# Patient Record
Sex: FEMALE | Race: OTHER | NOT HISPANIC OR LATINO | URBAN - METROPOLITAN AREA
[De-identification: names, ages, dates, MRNs, and addresses within clinical notes are randomized per-mention and may not be internally consistent; named-entity substitution may affect disease eponyms.]

---

## 2018-03-29 PROBLEM — Z00.00 ENCOUNTER FOR PREVENTIVE HEALTH EXAMINATION: Status: ACTIVE | Noted: 2018-03-29

## 2018-03-30 ENCOUNTER — OUTPATIENT (OUTPATIENT)
Dept: OUTPATIENT SERVICES | Facility: HOSPITAL | Age: 68
LOS: 1 days | End: 2018-03-30

## 2018-03-30 ENCOUNTER — APPOINTMENT (OUTPATIENT)
Dept: OPHTHALMOLOGY | Facility: CLINIC | Age: 68
End: 2018-03-30

## 2018-04-02 DIAGNOSIS — H40.003 PREGLAUCOMA, UNSPECIFIED, BILATERAL: ICD-10-CM

## 2019-03-20 ENCOUNTER — APPOINTMENT (OUTPATIENT)
Dept: OPHTHALMOLOGY | Facility: CLINIC | Age: 69
End: 2019-03-20

## 2019-03-27 ENCOUNTER — OUTPATIENT (OUTPATIENT)
Dept: OUTPATIENT SERVICES | Facility: HOSPITAL | Age: 69
LOS: 1 days | End: 2019-03-27

## 2019-03-27 ENCOUNTER — APPOINTMENT (OUTPATIENT)
Dept: OPHTHALMOLOGY | Facility: CLINIC | Age: 69
End: 2019-03-27

## 2019-03-27 DIAGNOSIS — H40.003 PREGLAUCOMA, UNSPECIFIED, BILATERAL: ICD-10-CM

## 2019-04-02 ENCOUNTER — APPOINTMENT (OUTPATIENT)
Dept: OPHTHALMOLOGY | Facility: CLINIC | Age: 69
End: 2019-04-02

## 2019-05-28 ENCOUNTER — APPOINTMENT (OUTPATIENT)
Dept: PHYSICAL MEDICINE AND REHAB | Facility: CLINIC | Age: 69
End: 2019-05-28
Payer: MEDICARE

## 2019-05-28 VITALS — WEIGHT: 222 LBS | HEIGHT: 62 IN | BODY MASS INDEX: 40.85 KG/M2

## 2019-05-28 DIAGNOSIS — Z87.39 PERSONAL HISTORY OF OTHER DISEASES OF THE MUSCULOSKELETAL SYSTEM AND CONNECTIVE TISSUE: ICD-10-CM

## 2019-05-28 DIAGNOSIS — Z86.39 PERSONAL HISTORY OF OTHER ENDOCRINE, NUTRITIONAL AND METABOLIC DISEASE: ICD-10-CM

## 2019-05-28 DIAGNOSIS — Z86.69 PERSONAL HISTORY OF OTHER DISEASES OF THE NERVOUS SYSTEM AND SENSE ORGANS: ICD-10-CM

## 2019-05-28 DIAGNOSIS — Z86.79 PERSONAL HISTORY OF OTHER DISEASES OF THE CIRCULATORY SYSTEM: ICD-10-CM

## 2019-05-28 DIAGNOSIS — Z78.9 OTHER SPECIFIED HEALTH STATUS: ICD-10-CM

## 2019-05-28 PROCEDURE — 99213 OFFICE O/P EST LOW 20 MIN: CPT | Mod: 25

## 2019-05-28 PROCEDURE — 20552 NJX 1/MLT TRIGGER POINT 1/2: CPT

## 2019-05-28 RX ORDER — METFORMIN HYDROCHLORIDE 1000 MG/1
1000 TABLET, COATED ORAL
Refills: 0 | Status: ACTIVE | COMMUNITY

## 2019-05-28 RX ORDER — LIRAGLUTIDE 6 MG/ML
18 INJECTION SUBCUTANEOUS
Refills: 0 | Status: ACTIVE | COMMUNITY

## 2019-05-28 RX ORDER — ROSUVASTATIN CALCIUM 10 MG/1
10 TABLET, FILM COATED ORAL
Refills: 0 | Status: ACTIVE | COMMUNITY

## 2019-05-28 RX ORDER — LAMOTRIGINE 25 MG/1
25 TABLET ORAL
Refills: 0 | Status: ACTIVE | COMMUNITY

## 2019-05-28 RX ORDER — LOSARTAN POTASSIUM 100 MG/1
100 TABLET, FILM COATED ORAL
Refills: 0 | Status: ACTIVE | COMMUNITY

## 2019-05-28 RX ORDER — ALENDRONATE SODIUM 70 MG/1
70 TABLET ORAL
Refills: 0 | Status: ACTIVE | COMMUNITY

## 2019-05-28 RX ORDER — METOCLOPRAMIDE 5 MG/1
TABLET ORAL
Refills: 0 | Status: ACTIVE | COMMUNITY

## 2019-05-28 RX ORDER — ACETAMINOPHEN 325 MG
TABLET ORAL
Refills: 0 | Status: ACTIVE | COMMUNITY

## 2019-05-28 NOTE — HISTORY OF PRESENT ILLNESS
[FreeTextEntry1] : Location: back\par Quality: sharp \par Severity: moderate \par Duration: few years \par Timing: recurrent \par Context: atraumatic \par Aggravating Factors: walking \par Alleviating Factors: KELLE; RF \par Associated Symptoms: no weakness; no weight loss; no fever; no chills; no change in bowel/bladder habits; numbness/tingling (tingling in legs); no radiation down leg\par Prior Studies: MRI\par \par 12/2013 right MBB with Dr. Laurent\par 4/2014 right RFA with Dr. Laurent - significant relief\par 1/2015 left L3, L4, L5 MBB \par 2/2015 left RFA - significant relief\par 5/2016 right RFA - significant relief\par 12/2016 right L5 and S1 KELLE - moderate relief\par 11/2018 bilateral S1 KELLE - significant relief in legs and right back\par 12/2018 left L4/5 and L5/S1 facet injection\par 12/2018 left L3, L4, L5 RFA - no more pain with oblique ext\par 1/2019 left SI joint injection

## 2019-05-28 NOTE — PROCEDURE
[de-identified] : \par \par 0.25 x 40 mm sterile solid steel needles were inserted into right Ub 25 to 26, and glut bianca and manipulated intermittently over the following 15 minutes. The needles were then removed. Hemostasis was achieved immediately. She  tolerated the procedure well and was given discharge instructions and then discharged to home without any complaints or complications.\par \par

## 2019-05-28 NOTE — PHYSICAL EXAM
[FreeTextEntry1] : BEN is a 69 year female \par Constitutional: healthy appearing, NAD, and obese\par \par LUMBAR\par ROM: flexion to 20 deg with pain, no pain with ext\par \par Gait: normal\par \par Inspection: no erythema, warmth\par Spine: no TTP in spinous process, SI joint, sacrum\par Bony palpation: no TTP in GT\par \par Soft tissue palpation hip: TTP in right gluteus bianca, no TTP in glut medius\par Soft tissue palpation of spine: no TTP in lumbar paraspinals\par \par 5/5 bilateral HF, KE, DF, PF \par sensation intact in bilat LE\par reflexes: knee and ankle 0 bilat\par \par Special tests: neg seated SLR\par \par

## 2019-07-18 ENCOUNTER — APPOINTMENT (OUTPATIENT)
Dept: PHYSICAL MEDICINE AND REHAB | Facility: CLINIC | Age: 69
End: 2019-07-18
Payer: MEDICARE

## 2019-07-18 VITALS — HEIGHT: 62 IN | BODY MASS INDEX: 40.85 KG/M2 | WEIGHT: 222 LBS

## 2019-07-18 PROCEDURE — 20552 NJX 1/MLT TRIGGER POINT 1/2: CPT

## 2019-07-18 PROCEDURE — 99213 OFFICE O/P EST LOW 20 MIN: CPT | Mod: 25

## 2019-07-18 NOTE — PHYSICAL EXAM
[FreeTextEntry1] : BEN is a 69 year female \par Constitutional: healthy appearing, NAD, and obese\par \par LUMBAR\par ROM: flexion to 20 deg with pain, no pain with ext\par \par Gait: antalgic with cane\par \par Inspection: no erythema, warmth\par Spine: no TTP in spinous process, SI joint, sacrum\par Bony palpation: no TTP in GT\par \par Soft tissue palpation hip: TTP in right gluteus bianca, no TTP in glut medius\par Soft tissue palpation of spine: no TTP in lumbar paraspinals\par \par 5/5 bilateral HF, KE, DF, PF \par sensation intact in bilat LE\par reflexes: knee and ankle 0 bilat\par \par Special tests: neg seated SLR\par

## 2019-07-18 NOTE — HISTORY OF PRESENT ILLNESS
[FreeTextEntry1] : Location: back\par Quality: sharp \par Severity: 7/10, worse lately\par Duration: few years \par Timing: recurrent \par Context: atraumatic \par Aggravating Factors: walking \par Alleviating Factors: KELLE; RF \par Associated Symptoms: no weakness; no weight loss; no fever; no chills; no change in bowel/bladder habits; numbness/tingling (tingling in legs); no radiation down leg\par Prior Studies: MRI\par \par 12/2013 right MBB with Dr. Laurnet\par 4/2014 right RFA with Dr. Laurent - significant relief\par 1/2015 left L3, L4, L5 MBB \par 2/2015 left RFA - significant relief\par 5/2016 right RFA - significant relief\par 12/2016 right L5 and S1 KELLE - moderate relief\par 11/2018 bilateral S1 KELLE - significant relief in legs and right back\par 12/2018 left L4/5 and L5/S1 facet injection\par 12/2018 left L3, L4, L5 RFA - no more pain with oblique ext\par 1/2019 left SI joint injection

## 2019-07-18 NOTE — PROCEDURE
[de-identified] : After informed consent,she elected to proceed with a trigger point injection into the bilateral gluteus bianca. I confirmed no prior adverse reactions, no active infections, and no relevant allergies. \par  \par The skin was prepped in the usual sterile manner.  The sites were injected with local anesthetic followed by local needling. The injection was completed without complication and a bandage was applied. She tolerated the procedure well and was given post-injection instructions. \par  \par Cold Tx x 48 hours, analgesics prn. Medications: 0.5 ml of 1% Lidocaine per site\par

## 2019-07-19 ENCOUNTER — APPOINTMENT (OUTPATIENT)
Dept: PHYSICAL MEDICINE AND REHAB | Facility: CLINIC | Age: 69
End: 2019-07-19
Payer: MEDICARE

## 2019-07-19 PROCEDURE — 27096 INJECT SACROILIAC JOINT: CPT | Mod: LT

## 2019-09-03 ENCOUNTER — APPOINTMENT (OUTPATIENT)
Dept: PHYSICAL MEDICINE AND REHAB | Facility: CLINIC | Age: 69
End: 2019-09-03
Payer: MEDICARE

## 2019-09-03 PROCEDURE — 27096 INJECT SACROILIAC JOINT: CPT | Mod: RT

## 2019-09-24 ENCOUNTER — APPOINTMENT (OUTPATIENT)
Dept: PHYSICAL MEDICINE AND REHAB | Facility: CLINIC | Age: 69
End: 2019-09-24
Payer: MEDICARE

## 2019-09-24 PROCEDURE — 99214 OFFICE O/P EST MOD 30 MIN: CPT

## 2019-09-24 NOTE — ASSESSMENT
[FreeTextEntry1] : Will do right MBB. Do partial bridges.  Taught wall squat.  Educated to lose weight.

## 2019-09-24 NOTE — HISTORY OF PRESENT ILLNESS
[FreeTextEntry1] : Location: back\par Quality: sharp \par Severity: 8/10, worse lately\par Duration: few years \par Timing: recurrent \par Context: atraumatic \par Aggravating Factors: walking \par Alleviating Factors: KELLE; RF \par Associated Symptoms: no weakness; no weight loss; no fever; no chills; no change in bowel/bladder habits; numbness/tingling (tingling in legs); no radiation down leg\par Prior Studies: MRI\par \par 12/2013 right MBB with Dr. Laurent\par 4/2014 right RFA with Dr. Laurent - significant relief\par 1/2015 left L3, L4, L5 MBB \par 2/2015 left RFA - significant relief\par 5/2016 right RFA - significant relief\par 12/2016 right L5 and S1 KELLE - moderate relief\par 11/2018 bilateral S1 KELLE - significant relief in legs and right back\par 12/2018 left L4/5 and L5/S1 facet injection\par 12/2018 left L3, L4, L5 RFA - no more pain with oblique ext\par 1/2019 left SI joint injection\par 7/2019 left SI joint injection\par 9/2019 right SI joint injection

## 2019-09-24 NOTE — PHYSICAL EXAM
[FreeTextEntry1] : BEN is a 69 year female \par Constitutional: healthy appearing, NAD, and obese\par \par LUMBAR\par ROM: flexion to 20 deg with pain, ext 5 deg\par \par Gait: antalgic with cane\par \par Inspection: no erythema, warmth\par Spine: no TTP in spinous process, TTP in right SI joint, no TTP in sacrum\par Bony palpation: no TTP in GT\par \par Soft tissue palpation hip: TTP in right gluteus bianca, no TTP in glut medius\par Soft tissue palpation of spine: no TTP in lumbar paraspinals\par \par 5/5 bilateral HF, KE, DF, PF \par sensation intact in bilat LE\par reflexes: knee and ankle 0 bilat\par \par Special tests: neg seated SLR\par

## 2019-09-26 ENCOUNTER — APPOINTMENT (OUTPATIENT)
Dept: PHYSICAL MEDICINE AND REHAB | Facility: CLINIC | Age: 69
End: 2019-09-26
Payer: MEDICARE

## 2019-09-26 VITALS — HEIGHT: 62 IN | BODY MASS INDEX: 40.85 KG/M2 | WEIGHT: 222 LBS

## 2019-09-26 PROCEDURE — 99213 OFFICE O/P EST LOW 20 MIN: CPT

## 2019-09-26 NOTE — HISTORY OF PRESENT ILLNESS
[FreeTextEntry1] : Location: neck \par Quality: dull ache, sharp, shooting, achy \par current pain level: 8/10\par Duration: 2 weeks \par Frequency: constant pain \par Alleviating Factors:moltrin when it gets bad at night \par Aggravating Factors: lying down, sitting up, standing, walking, turning head \par Conservative treatment tried: chiropractor but did not help, heat, ice \par Associated Symptoms: headaches, has neck arthritis \par Prior Studies: mri in 2015 \par

## 2019-09-26 NOTE — ASSESSMENT
[FreeTextEntry1] : Had left C3/4/5 MBB previously, patient reported greater than 80% improvement in symptoms transiently after medial branch blocks as reported by pain diary. Pain then returned to pre injection level. Will perform left sided MBB again, she may be candidate for RFA after. May need to repeat on right side after left side is done. \par

## 2019-09-26 NOTE — PHYSICAL EXAM
[Normal] : Oriented to person, place, and time, insight and judgement were intact and the affect was normal [de-identified] : no gross deformity, TTP over the bilateral cervical paraspinals, ROM limited in all planes, negative Spurling's, negative Gaming's\par

## 2019-09-27 ENCOUNTER — APPOINTMENT (OUTPATIENT)
Dept: PHYSICAL MEDICINE AND REHAB | Facility: CLINIC | Age: 69
End: 2019-09-27

## 2019-10-01 ENCOUNTER — APPOINTMENT (OUTPATIENT)
Dept: PHYSICAL MEDICINE AND REHAB | Facility: CLINIC | Age: 69
End: 2019-10-01
Payer: MEDICARE

## 2019-10-01 PROCEDURE — 64491 INJ PARAVERT F JNT C/T 2 LEV: CPT | Mod: LT

## 2019-10-01 PROCEDURE — 64490 INJ PARAVERT F JNT C/T 1 LEV: CPT | Mod: LT

## 2019-10-03 ENCOUNTER — APPOINTMENT (OUTPATIENT)
Dept: PHYSICAL MEDICINE AND REHAB | Facility: CLINIC | Age: 69
End: 2019-10-03

## 2019-10-10 ENCOUNTER — APPOINTMENT (OUTPATIENT)
Dept: PHYSICAL MEDICINE AND REHAB | Facility: CLINIC | Age: 69
End: 2019-10-10
Payer: MEDICARE

## 2019-10-10 PROCEDURE — 64633 DESTROY CERV/THOR FACET JNT: CPT | Mod: LT

## 2019-10-10 PROCEDURE — 64634 DESTROY C/TH FACET JNT ADDL: CPT | Mod: LT

## 2019-10-23 ENCOUNTER — APPOINTMENT (OUTPATIENT)
Dept: PHYSICAL MEDICINE AND REHAB | Facility: CLINIC | Age: 69
End: 2019-10-23
Payer: MEDICARE

## 2019-10-23 VITALS — BODY MASS INDEX: 40.85 KG/M2 | HEIGHT: 62 IN | WEIGHT: 222 LBS

## 2019-10-23 DIAGNOSIS — M54.81 OCCIPITAL NEURALGIA: ICD-10-CM

## 2019-10-23 PROCEDURE — 99214 OFFICE O/P EST MOD 30 MIN: CPT

## 2019-10-24 ENCOUNTER — APPOINTMENT (OUTPATIENT)
Dept: PHYSICAL MEDICINE AND REHAB | Facility: CLINIC | Age: 69
End: 2019-10-24
Payer: MEDICARE

## 2019-10-24 PROCEDURE — 20552 NJX 1/MLT TRIGGER POINT 1/2: CPT

## 2019-10-24 PROCEDURE — 99213 OFFICE O/P EST LOW 20 MIN: CPT | Mod: 25

## 2019-10-24 NOTE — HISTORY OF PRESENT ILLNESS
[FreeTextEntry1] : Location: neck \par Quality: dull ache, sharp, shooting, achy \par current pain level: 8/10\par Duration: few weeks \par Frequency: constant pain \par Alleviating Factors:motrin when it gets bad at night \par Aggravating Factors: lying down, sitting up, standing, walking, turning head \par Conservative treatment tried: chiropractor but did not help, heat, ice \par Associated Symptoms: headaches, no numbness, radiation down arm\par Prior Studies: MRI\par \par cervical RFA with Dr Murphy\par \par Back is not too bad lately.

## 2019-10-24 NOTE — PROCEDURE
[de-identified] : \par Trigger Point Tx\par After cleaning with alcohol, sterile needles were inserted into Du 14 and left trapezius in multiple areas and manipulated intermittently followed by injection of Lidocaine. The needles were then removed. Hemostasis was achieved immediately. She  tolerated the procedure well and was given discharge instructions and then discharged to home without any complaints or complications.\par \par

## 2019-10-24 NOTE — PHYSICAL EXAM
[FreeTextEntry1] : BEN is a 69 year  yr old female \par \par Constitutional: healthy appearing, NAD, and obese\par \par NECK\par ROM: flexion to 30, ext to 30, rotation to 45 deg bilat\par \par Inspection: no erythema, warmth\par Spine: no TTP in spinous process\par Soft tissue palpation: no TTP in cervical paraspinals, rhomboids, TTP in left trapezius\par \par 5/5 bilateral elb flex/ext, WE, finger abd/flex\par sensation intact in bilat UE\par reflexes:  biceps and triceps 0 bilat\par \par \par \par

## 2019-10-24 NOTE — ASSESSMENT
[FreeTextEntry1] : She canceled MBB because had rash which turned out to be dermatitis.  F/u with Derm.  Pain is not bad so hold off on MBB. \par \par Consider repeat MRI cervical if not better in a couple wk.

## 2019-10-28 NOTE — HISTORY OF PRESENT ILLNESS
[FreeTextEntry1] : Patient presents for follow up, reports improvement in left sided neck pain but now is having stiffness in the left scapular muscles and left occiput. Pain is severe. MRI reviewed again and there is left sided foraminal narrowing in multiple levels of the C spine.

## 2019-10-28 NOTE — PHYSICAL EXAM
[Normal] : Deep tendon reflexes were 2+ and symmetric, the sensory exam was normal to light touch and pinprick and no focal deficits [de-identified] : no gross deformity, TTP over the bilateral cervical paraspinals, ROM limited in all planes, negative Spurling's, negative Gaming's\par

## 2019-10-28 NOTE — ASSESSMENT
[FreeTextEntry1] : Left sided RFA done less than 2 weeks ago, may still be too early to determine improvement. Symptoms are also different than before. Her previous pain is much better. Current symptoms could be a combination of occipital neuralgia and/or cervical radiculitis. Recommend chiro, PT, acupuncture and follow up after Nov 7. Consider occipital nerve block if no improvement. May also need left C7/T1 ILESI.

## 2019-10-31 ENCOUNTER — APPOINTMENT (OUTPATIENT)
Dept: PHYSICAL MEDICINE AND REHAB | Facility: CLINIC | Age: 69
End: 2019-10-31
Payer: MEDICARE

## 2019-10-31 PROCEDURE — 20552 NJX 1/MLT TRIGGER POINT 1/2: CPT

## 2019-10-31 PROCEDURE — 99213 OFFICE O/P EST LOW 20 MIN: CPT | Mod: 25

## 2019-10-31 NOTE — PROCEDURE
[de-identified] : \par Trigger Point Tx\par After cleaning with alcohol, sterile needles were inserted into  Du 14, and left trapezius in multiple spots and manipulated intermittently followed by injection of Lidocaine. The needles were then removed. Hemostasis was achieved immediately. She  tolerated the procedure well and was given discharge instructions and then discharged to home without any complaints or complications.\par \par

## 2019-10-31 NOTE — ASSESSMENT
[FreeTextEntry1] : Her dermatologist passed away so find another one.  \par \par Do not lean on ice pack.  Gently apply it to back of neck.

## 2019-10-31 NOTE — PHYSICAL EXAM
[FreeTextEntry1] : BEN is a 69 year yr old female \par \par Constitutional: healthy appearing, NAD, and obese\par \par NECK\par ROM: flexion to 30, ext to 30, rotation to 45 deg bilat\par \par Inspection: no erythema, warmth\par Spine: no TTP in spinous process\par Soft tissue palpation: no TTP in cervical paraspinals, rhomboids, TTP in left trapezius\par \par 5/5 bilateral elb flex/ext, WE, finger abd/flex\par sensation intact in bilat UE\par reflexes: biceps and triceps 0 bilat\par

## 2019-10-31 NOTE — HISTORY OF PRESENT ILLNESS
[FreeTextEntry1] : Location: neck \par Quality: dull ache, sharp, shooting\par current pain level moderate\par Duration: few weeks \par Frequency: constant pain \par Alleviating Factors:motrin when it gets bad at night \par Aggravating Factors: lying down, sitting up, standing, walking, turning head \par Conservative treatment tried: chiropractor but did not help, heat, ice \par Associated Symptoms: +headaches, no numbness, no radiation down arm\par Prior Studies: MRI\par \par cervical RFA with Dr Murphy\par \par

## 2019-11-07 ENCOUNTER — APPOINTMENT (OUTPATIENT)
Dept: PHYSICAL MEDICINE AND REHAB | Facility: CLINIC | Age: 69
End: 2019-11-07
Payer: MEDICARE

## 2019-11-07 PROCEDURE — 99213 OFFICE O/P EST LOW 20 MIN: CPT | Mod: 25

## 2019-11-07 PROCEDURE — 20552 NJX 1/MLT TRIGGER POINT 1/2: CPT

## 2019-11-07 NOTE — PROCEDURE
[de-identified] : \par Trigger Point Tx\par After cleaning with alcohol, sterile needles were inserted into  Du 14, and left trapezius in multiple spots and manipulated intermittently followed by injection of Lidocaine. The needles were then removed. Hemostasis was achieved immediately. She  tolerated the procedure well and was given discharge instructions and then discharged to home without any complaints or complications.\par \par

## 2019-11-18 ENCOUNTER — APPOINTMENT (OUTPATIENT)
Dept: PHYSICAL MEDICINE AND REHAB | Facility: CLINIC | Age: 69
End: 2019-11-18
Payer: MEDICARE

## 2019-11-18 PROCEDURE — 99213 OFFICE O/P EST LOW 20 MIN: CPT | Mod: 25

## 2019-11-18 PROCEDURE — 20552 NJX 1/MLT TRIGGER POINT 1/2: CPT

## 2019-11-18 NOTE — PROCEDURE
[de-identified] : \par Trigger Point Tx\par After cleaning with alcohol, sterile needles were inserted into Du 14, and trapezius bilaterally and manipulated intermittently followed by injection of Lidocaine. The needles were then removed. Hemostasis was achieved immediately. She  tolerated the procedure well and was given discharge instructions and then discharged to home without any complaints or complications.\par \par

## 2019-11-19 ENCOUNTER — APPOINTMENT (OUTPATIENT)
Dept: PHYSICAL MEDICINE AND REHAB | Facility: CLINIC | Age: 69
End: 2019-11-19
Payer: MEDICARE

## 2019-11-19 VITALS — HEIGHT: 62 IN | BODY MASS INDEX: 40.85 KG/M2 | WEIGHT: 222 LBS

## 2019-11-19 PROCEDURE — 99213 OFFICE O/P EST LOW 20 MIN: CPT

## 2019-11-20 NOTE — ASSESSMENT
[FreeTextEntry1] : Improved after RFA, start PT. If no improvement in occipital pain after a course of PT, consider left occipital nerve block.

## 2019-11-20 NOTE — HISTORY OF PRESENT ILLNESS
[FreeTextEntry1] : Patient presents for follow up after left C3/4/5 RFA. Notes 100% improvement in left sided neck pain. Still having some occipital headaches. Also having some ROM issues.

## 2019-11-20 NOTE — PHYSICAL EXAM
[Normal] : Oriented to person, place, and time, insight and judgement were intact and the affect was normal [de-identified] : no gross deformity, TTP over the bilateral cervical paraspinals, TTP over the left occiput in region supplied by greater occipital nerve, ROM limited with flexion and lateral rotation, negative Spurling's, negative Gaming's\par

## 2019-11-26 ENCOUNTER — APPOINTMENT (OUTPATIENT)
Dept: PHYSICAL MEDICINE AND REHAB | Facility: CLINIC | Age: 69
End: 2019-11-26
Payer: MEDICARE

## 2019-11-26 PROCEDURE — 99213 OFFICE O/P EST LOW 20 MIN: CPT | Mod: 25

## 2019-11-26 PROCEDURE — 20552 NJX 1/MLT TRIGGER POINT 1/2: CPT

## 2019-11-26 NOTE — HISTORY OF PRESENT ILLNESS
[FreeTextEntry1] : Location: neck \par Quality: dull ache, sharp, shooting\par Pain level: 4/10\par Duration: few weeks \par Frequency: constant pain \par Alleviating Factors:motrin when it gets bad at night \par Aggravating Factors: lying down, sitting up, standing, walking, turning head \par Conservative treatment tried: chiropractor but did not help, heat, ice \par Associated Symptoms: +headaches, no numbness, no radiation down arm\par Prior Studies: MRI\par \par cervical RFA with Dr Murphy\par \par \par Location: back\par Quality: sharp \par Severity: moderate, worse lately\par Duration: few years \par Timing: recurrent \par Context: atraumatic \par Aggravating Factors: walking \par Alleviating Factors: KELLE; RF \par Associated Symptoms: no weakness; no weight loss; no fever; no chills; no change in bowel/bladder habits; numbness/tingling (tingling in legs); no radiation down leg\par Prior Studies: MRI\par \par 12/2013 right MBB with Dr. Laurent\par 4/2014 right RFA with Dr. Laurent - significant relief\par 1/2015 left L3, L4, L5 MBB \par 2/2015 left RFA - significant relief\par 5/2016 right RFA - significant relief\par 12/2016 right L5 and S1 KELLE - moderate relief\par 11/2018 bilateral S1 KELLE - significant relief in legs and right back\par 12/2018 left L4/5 and L5/S1 facet injection\par 12/2018 left L3, L4, L5 RFA - no more pain with oblique ext\par 1/2019 left SI joint injection\par 7/2019 left SI joint injection\par 9/2019 right SI joint injection \par \par

## 2019-11-26 NOTE — PROCEDURE
[de-identified] : \par Trigger Point Tx\par After cleaning with alcohol, sterile needles were inserted into Du 14, and left trapezius in multiple spots and manipulated intermittently followed by injection of Lidocaine. The needles were then removed. Hemostasis was achieved immediately. She  tolerated the procedure well and was given discharge instructions and then discharged to home without any complaints or complications.\par \par

## 2019-11-26 NOTE — PHYSICAL EXAM
[FreeTextEntry1] : BEN is a 69 year yr old female \par \par Constitutional: healthy appearing, NAD, and obese\par \par NECK\par ROM: flexion to 30, ext to 30, rotation to 45 deg bilat\par \par Inspection: no erythema, warmth\par Spine: no TTP in spinous process\par Soft tissue palpation: no TTP in cervical paraspinals, rhomboids, TTP in left trapezius\par \par 5/5 bilateral elb flex/ext, WE, finger abd/flex\par sensation intact in bilat UE\par reflexes: biceps and triceps 0 bilat\par \par \par 5/5 bilat LE\par sensation intact in bilat LE

## 2019-12-03 ENCOUNTER — APPOINTMENT (OUTPATIENT)
Dept: PHYSICAL MEDICINE AND REHAB | Facility: CLINIC | Age: 69
End: 2019-12-03

## 2020-08-05 ENCOUNTER — OUTPATIENT (OUTPATIENT)
Dept: OUTPATIENT SERVICES | Facility: HOSPITAL | Age: 70
LOS: 1 days | End: 2020-08-05

## 2020-08-05 ENCOUNTER — APPOINTMENT (OUTPATIENT)
Dept: OPHTHALMOLOGY | Facility: CLINIC | Age: 70
End: 2020-08-05

## 2020-08-06 DIAGNOSIS — H40.003 PREGLAUCOMA, UNSPECIFIED, BILATERAL: ICD-10-CM

## 2020-09-28 ENCOUNTER — APPOINTMENT (OUTPATIENT)
Dept: PHYSICAL MEDICINE AND REHAB | Facility: CLINIC | Age: 70
End: 2020-09-28
Payer: MEDICARE

## 2020-09-28 PROCEDURE — 99213 OFFICE O/P EST LOW 20 MIN: CPT | Mod: 25

## 2020-09-28 PROCEDURE — 73030 X-RAY EXAM OF SHOULDER: CPT | Mod: LT

## 2020-09-28 PROCEDURE — 20552 NJX 1/MLT TRIGGER POINT 1/2: CPT

## 2020-09-28 NOTE — PROCEDURE
[de-identified] : After informed consent,she elected to proceed with a trigger point injection into the bilateral trapezius. I confirmed no prior adverse reactions, no active infections, and no relevant allergies. \par  \par The skin was prepped in the usual sterile manner. The muscles were pinched and elevated from the chest wall to avoid puncturing the lung. The sites were injected with local anesthetic followed by local needling. The injection was completed without complication and a bandage was applied. She tolerated the procedure well and was given post-injection instructions. \par  \par Cold Tx x 48 hours, analgesics prn. Medications: 0.5 ml of 1% Lidocaine per site\par \par

## 2020-09-28 NOTE — DATA REVIEWED
[Plain X-Rays] : plain X-Rays [FreeTextEntry1] : Office x-rays of the left shoulder AP outlet show mild ac joint arthritis. No gross fractures.

## 2020-09-28 NOTE — HISTORY OF PRESENT ILLNESS
[FreeTextEntry1] : Left shoulder hurting lately.  She is working out on zoom 5 days a wk.  No pain down past elbow.  \par \par cervical RFA with Dr Murphy\par \par \par Location: back\par Quality: sharp \par Severity: 6/10, was doing well since last visit; worse lately\par Duration: few years \par Timing: recurrent \par Context: atraumatic \par Aggravating Factors: walking \par Alleviating Factors: KELLE; RF \par Associated Symptoms: no weakness; no weight loss; no fever; no chills; no change in bowel/bladder habits; numbness/tingling (tingling in legs); no radiation down leg\par Prior Studies: MRI\par \par 12/2013 right MBB with Dr. Laurent\par 4/2014 right RFA with Dr. Laurent - significant relief\par 1/2015 left L3, L4, L5 MBB \par 2/2015 left RFA - significant relief\par 5/2016 right RFA - significant relief\par 12/2016 right L5 and S1 KELLE - moderate relief\par 11/2018 bilateral S1 KELLE - significant relief in legs and right back\par 12/2018 left L4/5 and L5/S1 facet injection\par 12/2018 left L3, L4, L5 RFA - no more pain with oblique ext\par 1/2019 left SI joint injection\par 7/2019 left SI joint injection\par 9/2019 right SI joint injection \par

## 2020-09-28 NOTE — PHYSICAL EXAM
[FreeTextEntry1] : BEN is a 70 year yr old female \par \par Constitutional: healthy appearing, NAD, and obese\par \par NECK\par ROM: flexion to 30, ext to 30, rotation to 45 deg bilat\par \par Inspection: no erythema, warmth\par Spine: no TTP in spinous process\par Soft tissue palpation: no TTP in cervical paraspinals, rhomboids, TTP in trapezius bilat\par \par 5/5 bilateral elb flex/ext, WE, finger abd/flex\par sensation intact in bilat UE\par reflexes: biceps and triceps 0 bilat\par \par left shoulder: abd to 110 deg, ER to 70, IR to 40 deg\par +empty can\par BACK:\par TTP in SI joint bilat\par no swelling, erythema, warmth\par flexion to 30 deg, ext to 5 deg\par 5/5 bilat LE\par sensation intact in bilat LE.

## 2020-09-29 ENCOUNTER — APPOINTMENT (OUTPATIENT)
Dept: PHYSICAL MEDICINE AND REHAB | Facility: CLINIC | Age: 70
End: 2020-09-29
Payer: MEDICARE

## 2020-09-29 VITALS — TEMPERATURE: 97.5 F

## 2020-09-29 PROCEDURE — 27096 INJECT SACROILIAC JOINT: CPT | Mod: LT

## 2020-09-29 RX ORDER — AZITHROMYCIN 250 MG/1
250 TABLET, FILM COATED ORAL
Qty: 6 | Refills: 0 | Status: DISCONTINUED | COMMUNITY
Start: 2020-05-01

## 2020-09-29 RX ORDER — GABAPENTIN 100 MG/1
100 CAPSULE ORAL
Qty: 360 | Refills: 0 | Status: DISCONTINUED | COMMUNITY
Start: 2020-07-21

## 2020-09-29 RX ORDER — AMOXICILLIN 875 MG/1
875 TABLET, FILM COATED ORAL
Qty: 15 | Refills: 0 | Status: DISCONTINUED | COMMUNITY
Start: 2020-05-15

## 2020-09-29 RX ORDER — SODIUM SULFACETAMIDE 10% AND SULFUR 5% EMOLLIENT CREAM 100; 50 MG/G; MG/G
10-5 CREAM TOPICAL
Qty: 28 | Refills: 0 | Status: DISCONTINUED | COMMUNITY
Start: 2020-06-23

## 2020-09-29 RX ORDER — FLASH GLUCOSE SENSOR
KIT MISCELLANEOUS
Qty: 1 | Refills: 0 | Status: DISCONTINUED | COMMUNITY
Start: 2020-02-04

## 2020-09-29 RX ORDER — CHLORHEXIDINE GLUCONATE, 0.12% ORAL RINSE 1.2 MG/ML
0.12 SOLUTION DENTAL
Qty: 473 | Refills: 0 | Status: DISCONTINUED | COMMUNITY
Start: 2020-05-15

## 2020-09-29 RX ORDER — TRIAMCINOLONE ACETONIDE 1 MG/G
0.1 CREAM TOPICAL
Qty: 80 | Refills: 0 | Status: DISCONTINUED | COMMUNITY
Start: 2020-06-23

## 2020-09-29 RX ORDER — LIDOCAINE 5 G/100G
5 OINTMENT TOPICAL
Qty: 150 | Refills: 0 | Status: DISCONTINUED | COMMUNITY
Start: 2020-07-21

## 2020-10-06 ENCOUNTER — APPOINTMENT (OUTPATIENT)
Dept: PHYSICAL MEDICINE AND REHAB | Facility: CLINIC | Age: 70
End: 2020-10-06

## 2020-10-27 ENCOUNTER — APPOINTMENT (OUTPATIENT)
Dept: PHYSICAL MEDICINE AND REHAB | Facility: CLINIC | Age: 70
End: 2020-10-27
Payer: MEDICARE

## 2020-10-27 PROCEDURE — 99213 OFFICE O/P EST LOW 20 MIN: CPT

## 2020-10-27 NOTE — PHYSICAL EXAM
[FreeTextEntry1] : BEN is a 70 year yr old female \par \par Constitutional: healthy appearing, NAD, and obese\par \par BACK:\par TTP in SI joint bilat\par no swelling, erythema, warmth\par flexion to 30 deg, ext to 5 deg\par 5/5 bilat LE\par sensation intact in bilat LE\par \par left shoulder: \par abd to 170 deg, ER to 70, IR to 40 deg

## 2020-10-27 NOTE — HISTORY OF PRESENT ILLNESS
[FreeTextEntry1] : Location: back\par Quality: sharp \par Severity: 6/10, was doing well since last visit; worse lately\par Duration: few years \par Timing: recurrent \par Context: atraumatic \par Aggravating Factors: walking \par Alleviating Factors: KELLE; RF \par Associated Symptoms: no weakness; no weight loss; no fever; no chills; no change in bowel/bladder habits; numbness/tingling (tingling in legs); no radiation down leg\par Prior Studies: MRI\par \par 12/2013 right MBB with Dr. Laurent\par 4/2014 right RFA with Dr. Laurent - significant relief\par 1/2015 left L3, L4, L5 MBB \par 2/2015 left RFA - significant relief\par 5/2016 right RFA - significant relief\par 12/2016 right L5 and S1 KELLE - moderate relief\par 11/2018 bilateral S1 KELLE - significant relief in legs and right back\par 12/2018 left L4/5 and L5/S1 facet injection\par 12/2018 left L3, L4, L5 RFA - no more pain with oblique ext\par 1/2019 left SI joint injection\par 7/2019 left SI joint injection\par 9/2019 right SI joint injection \par 10/2020 left SI joint injection\par \par Left shoulder is better and has more ROM.  PT helping.  Still hurts if doing overhead exercises in workout class.

## 2020-10-27 NOTE — ASSESSMENT
[FreeTextEntry1] : She is doing PT for shoulders now.  Wants it for back too. \par \par Educated that she has to call [facility] to make back injection appointments.  Gave her ext.

## 2020-10-30 ENCOUNTER — APPOINTMENT (OUTPATIENT)
Dept: PHYSICAL MEDICINE AND REHAB | Facility: CLINIC | Age: 70
End: 2020-10-30
Payer: MEDICARE

## 2020-10-30 PROCEDURE — 27096 INJECT SACROILIAC JOINT: CPT | Mod: RT

## 2020-10-30 RX ORDER — EMPAGLIFLOZIN 25 MG/1
25 TABLET, FILM COATED ORAL
Qty: 30 | Refills: 0 | Status: ACTIVE | COMMUNITY
Start: 2020-09-28

## 2020-10-30 RX ORDER — BUPROPION HYDROCHLORIDE 150 MG/1
150 TABLET, EXTENDED RELEASE ORAL
Qty: 30 | Refills: 0 | Status: ACTIVE | COMMUNITY
Start: 2020-03-13

## 2020-10-30 RX ORDER — IBUPROFEN 800 MG/1
800 TABLET, FILM COATED ORAL
Qty: 24 | Refills: 0 | Status: ACTIVE | COMMUNITY
Start: 2020-05-15

## 2020-10-30 RX ORDER — MOMETASONE FUROATE 1 MG/G
0.1 CREAM TOPICAL
Qty: 15 | Refills: 0 | Status: ACTIVE | COMMUNITY
Start: 2020-08-10

## 2020-10-30 RX ORDER — BUPROPION HYDROCHLORIDE 150 MG/1
150 TABLET, EXTENDED RELEASE ORAL
Qty: 30 | Refills: 0 | Status: DISCONTINUED | COMMUNITY
Start: 2020-03-13

## 2020-10-30 RX ORDER — MOMETASONE FUROATE 1 MG/G
0.1 CREAM TOPICAL
Qty: 15 | Refills: 0 | Status: DISCONTINUED | COMMUNITY
Start: 2020-08-10

## 2020-10-30 RX ORDER — SEMAGLUTIDE 1.34 MG/ML
2 INJECTION, SOLUTION SUBCUTANEOUS
Qty: 3 | Refills: 0 | Status: ACTIVE | COMMUNITY
Start: 2020-09-28

## 2020-11-27 ENCOUNTER — EMERGENCY (EMERGENCY)
Facility: HOSPITAL | Age: 70
LOS: 1 days | Discharge: HOME | End: 2020-11-27
Admitting: EMERGENCY MEDICINE
Payer: MEDICARE

## 2020-11-27 VITALS
DIASTOLIC BLOOD PRESSURE: 60 MMHG | SYSTOLIC BLOOD PRESSURE: 97 MMHG | TEMPERATURE: 98 F | HEART RATE: 89 BPM | OXYGEN SATURATION: 95 % | RESPIRATION RATE: 18 BRPM

## 2020-11-27 VITALS — SYSTOLIC BLOOD PRESSURE: 117 MMHG | HEART RATE: 75 BPM | DIASTOLIC BLOOD PRESSURE: 70 MMHG

## 2020-11-27 DIAGNOSIS — Z20.828 CONTACT WITH AND (SUSPECTED) EXPOSURE TO OTHER VIRAL COMMUNICABLE DISEASES: ICD-10-CM

## 2020-11-27 DIAGNOSIS — Z91.011 ALLERGY TO MILK PRODUCTS: ICD-10-CM

## 2020-11-27 PROCEDURE — 99283 EMERGENCY DEPT VISIT LOW MDM: CPT | Mod: CS

## 2020-11-27 NOTE — ED PROVIDER NOTE - OBJECTIVE STATEMENT
71 yo F w/ no pertinent PMHx presents to the ED requesting COVID-19 testing. Pt has no medical complaints currently. Denies fever, chills, cough, SOB, chest pain, abdominal pain, N/V/D, throat pain. Pt denies contact with a person who has known COVID-19. 69 yo F w/ no pertinent PMHx presents to the ED requesting COVID-19 testing. She notes she had a low SpO2 reading of 91 at home but denies any associated symptoms. Pt has no medical complaints currently. Denies fever, chills, cough, SOB, chest pain, abdominal pain, N/V/D, throat pain. Pt denies contact with a person who has known COVID-19.

## 2020-11-27 NOTE — ED PROVIDER NOTE - PATIENT PORTAL LINK FT
You can access the FollowMyHealth Patient Portal offered by [facility] by registering at the following website: [email or web address]. By joining [facility]’s FollowMyHealth portal, you will also be able to view your health information using other applications (apps) compatible with our system.

## 2020-11-27 NOTE — ED PROVIDER NOTE - CLINICAL SUMMARY MEDICAL DECISION MAKING FREE TEXT BOX
Pt presents to the ED for COVID-19 testing. Pt denies medical complaints or symptoms. No vital sign derangements. Will swab for COVID-19 and discharge. Pt agrees to receiving results electronically. Pt presents to the ED for COVID-19 testing. She had low home SpO2 but is WNL for pt today at 95% in ED. Pt noted to have just gotten her nails done for the first time in several months. This likely caused the low SpO2 on her home device. Pt denies medical complaints or symptoms. No vital sign derangements. Will swab for COVID-19 and discharge. Pt agrees to receiving results electronically.

## 2020-12-01 ENCOUNTER — APPOINTMENT (OUTPATIENT)
Dept: PHYSICAL MEDICINE AND REHAB | Facility: CLINIC | Age: 70
End: 2020-12-01
Payer: MEDICARE

## 2020-12-01 PROCEDURE — 99213 OFFICE O/P EST LOW 20 MIN: CPT | Mod: 25

## 2020-12-01 PROCEDURE — 20611 DRAIN/INJ JOINT/BURSA W/US: CPT | Mod: LT

## 2020-12-01 NOTE — ASSESSMENT
[FreeTextEntry1] : She  understands risk of immunosuppression from steroids.\par \par Use bands to do RTC exercises.  \par \par Consider right S1 KELLE after MRI.

## 2020-12-01 NOTE — PHYSICAL EXAM
[FreeTextEntry1] : BEN is a 70 year yr old female \par \par Constitutional: healthy appearing, NAD, and obese\par \par BACK:\par TTP in SI joint bilat\par no swelling, erythema, warmth\par flexion to 30 deg, ext to 5 deg\par 5/5 bilat LE\par sensation intact in bilat LE\par \par left shoulder: \par abd to 175 deg, ER to 85, IR to 40 deg. \par

## 2020-12-01 NOTE — PROCEDURE
[de-identified] : Ultrasound Guided Left Shoulder Injection\par \par After discussion of the risks and benefits, she  elected to proceed with a corticosteroid injection into the subacromial space.\par \par Confirmed that the patient does not have history of prior adverse reactions, active infections, or relevant allergies. There was no effusion, erythema, or warmth, and the skin was clear.\par \par The skin was sterilized and then anesthetized with 1% Lidocaine. Under routine sterile technique, the site was injected with a mixture of 3 ml of 1% Lidocaine and 20 mg of Kenalog using ultrasound guidance. The injection was completed without complication and a bandage was applied.   \par \par She tolerated the procedure well and was given post-injection instructions.Rec: Cold therapy, analgesics, avoid heavy activity.\par

## 2020-12-01 NOTE — HISTORY OF PRESENT ILLNESS
[FreeTextEntry1] : Location: back\par Quality: sharp \par Severity: 6/10, was doing well but going down right thigh posteriorly lately\par Duration: few years \par Timing: recurrent \par Context: atraumatic \par Aggravating Factors: walking \par Alleviating Factors: KELLE; RF \par Associated Symptoms: no weakness; no weight loss; no fever; no chills; no change in bowel/bladder habits; numbness/tingling (tingling in legs); +radiation down leg\par Prior Studies: MRI\par \par 12/2013 right MBB with Dr. Laurent\par 4/2014 right RFA with Dr. Laurent - significant relief\par 1/2015 left L3, L4, L5 MBB \par 2/2015 left RFA - significant relief\par 5/2016 right RFA - significant relief\par 12/2016 right L5 and S1 KELLE - moderate relief\par 11/2018 bilateral S1 KELLE - significant relief in legs and right back\par 12/2018 left L4/5 and L5/S1 facet injection\par 12/2018 left L3, L4, L5 RFA - no more pain with oblique ext\par 1/2019 left SI joint injection\par 7/2019 left SI joint injection\par 9/2019 right SI joint injection \par 10/2020 left SI joint injection\par 10/2020 right SI joint injection\par \par Left shoulder is improving but still hurts. She has more ROM. PT helping. Still hurts if doing overhead exercises in workout class. Xray 9/2020. \par

## 2020-12-22 ENCOUNTER — NON-APPOINTMENT (OUTPATIENT)
Age: 70
End: 2020-12-22

## 2021-01-04 ENCOUNTER — APPOINTMENT (OUTPATIENT)
Dept: PHYSICAL MEDICINE AND REHAB | Facility: CLINIC | Age: 71
End: 2021-01-04
Payer: MEDICARE

## 2021-01-04 VITALS — TEMPERATURE: 92.1 F

## 2021-01-04 PROCEDURE — 99214 OFFICE O/P EST MOD 30 MIN: CPT

## 2021-01-04 RX ORDER — PAROXETINE HYDROCHLORIDE 10 MG/1
10 TABLET, FILM COATED ORAL
Qty: 15 | Refills: 0 | Status: ACTIVE | COMMUNITY
Start: 2020-12-18

## 2021-01-04 RX ORDER — SODIUM FLUORIDE 6 MG/ML
1.1 PASTE, DENTIFRICE DENTAL
Qty: 100 | Refills: 0 | Status: ACTIVE | COMMUNITY
Start: 2020-11-09

## 2021-01-04 NOTE — HISTORY OF PRESENT ILLNESS
[FreeTextEntry1] : Location: back\par Quality: sharp \par Severity: 5/10\par Duration: few years \par Timing: recurrent \par Context: atraumatic \par Aggravating Factors: walking \par Alleviating Factors: KELLE; RF \par Associated Symptoms: no weakness; no weight loss; no fever; no chills; no change in bowel/bladder habits; numbness/tingling (tingling in legs); +radiation down leg\par Prior Studies: MRI\par 12/2013 right MBB with Dr. Laurent\par 4/2014 right RFA with Dr. Laurent - significant relief\par 1/2015 left L3, L4, L5 MBB \par 2/2015 left RFA - significant relief\par 5/2016 right RFA - significant relief\par 12/2016 right L5 and S1 KELLE - moderate relief\par 11/2018 bilateral S1 KELLE - significant relief in legs and right back\par 12/2018 left L4/5 and L5/S1 facet injection\par 12/2018 left L3, L4, L5 RFA - no more pain with oblique ext\par 1/2019 left SI joint injection\par 7/2019 left SI joint injection\par 9/2019 right SI joint injection \par 10/2020 left SI joint injection\par 10/2020 right SI joint injection\par \par Left shoulder is improving but still hurts. PT is helping. Still hurts if doing overhead exercises in workout class. Xray 9/2020. \par

## 2021-01-04 NOTE — ASSESSMENT
[FreeTextEntry1] : MRI reviewed with her in detail.  Will start with MBB.  Stop nsaids.  \par \par Do not overexercise and take enough rest days.

## 2021-01-04 NOTE — PHYSICAL EXAM
[FreeTextEntry1] : BEN is a 70 year yr old female \par \par Constitutional: healthy appearing, NAD, and obese\par \par BACK:\par TTP in SI joint bilat\par no swelling, erythema, warmth\par flexion to 30 deg, ext to 5 deg\par 5/5 bilat LE\par sensation intact in bilat LE\par \par left shoulder: \par abd to 100 deg, ER to 85, IR to 40 deg\par +empty can\par  \par

## 2021-01-06 ENCOUNTER — APPOINTMENT (OUTPATIENT)
Dept: PHYSICAL MEDICINE AND REHAB | Facility: CLINIC | Age: 71
End: 2021-01-06
Payer: MEDICARE

## 2021-01-06 PROCEDURE — 64493 INJ PARAVERT F JNT L/S 1 LEV: CPT | Mod: LT

## 2021-01-06 PROCEDURE — 64494 INJ PARAVERT F JNT L/S 2 LEV: CPT | Mod: LT

## 2021-01-12 ENCOUNTER — APPOINTMENT (OUTPATIENT)
Dept: PHYSICAL MEDICINE AND REHAB | Facility: CLINIC | Age: 71
End: 2021-01-12
Payer: MEDICARE

## 2021-01-12 VITALS — TEMPERATURE: 96.3 F

## 2021-01-12 PROCEDURE — 64635 DESTROY LUMB/SAC FACET JNT: CPT | Mod: LT

## 2021-01-12 PROCEDURE — 64636 DESTROY L/S FACET JNT ADDL: CPT | Mod: LT

## 2021-02-09 ENCOUNTER — APPOINTMENT (OUTPATIENT)
Dept: PHYSICAL MEDICINE AND REHAB | Facility: CLINIC | Age: 71
End: 2021-02-09
Payer: MEDICARE

## 2021-02-09 PROCEDURE — 99214 OFFICE O/P EST MOD 30 MIN: CPT

## 2021-02-09 RX ORDER — GABAPENTIN 300 MG/1
300 CAPSULE ORAL
Refills: 0 | Status: DISCONTINUED | COMMUNITY
End: 2021-02-09

## 2021-02-09 NOTE — HISTORY OF PRESENT ILLNESS
[FreeTextEntry1] : Location: back\par Quality: sharp \par Severity: 5/10\par Duration: few years \par Timing: recurrent \par Context: atraumatic \par Aggravating Factors: walking \par Alleviating Factors: KELLE; RF \par Associated Symptoms: no weakness; no weight loss; no fever; no chills; no change in bowel/bladder habits; numbness/tingling (tingling in legs); +radiation down leg\par Prior Studies: MRI\par 12/2013 right MBB with Dr. Laurent\par 4/2014 right RFA with Dr. Laurent - significant relief\par 1/2015 left L3, L4, L5 MBB \par 2/2015 left RFA - significant relief\par 5/2016 right RFA - significant relief\par 12/2016 right L5 and S1 KELLE - moderate relief\par 11/2018 bilateral S1 KELLE - significant relief in legs and right back\par 12/2018 left L4/5 and L5/S1 facet injection\par 12/2018 left L3, L4, L5 RFA - no more pain with oblique ext\par 1/2019 left SI joint injection\par 7/2019 left SI joint injection\par 9/2019 right SI joint injection \par 10/2020 left SI joint injection\par 10/2020 right SI joint injection\par 1/2021 left L3, L4, L5 RFA\par \par Left shoulder is improving but still hurts. PT is helping. Still hurts if doing overhead exercises in workout class. Xray 9/2020.  Having left arm  tingling last couple days.  \par

## 2021-02-09 NOTE — PHYSICAL EXAM
[FreeTextEntry1] : BEN is a 70 year yr old female \par \par Constitutional: healthy appearing, NAD, and obese\par \par BACK:\par TTP in SI joint bilat\par no swelling, erythema, warmth\par flexion to 30 deg, ext to 5 deg\par 5/5 bilat LE\par sensation intact in bilat LE\par \par left shoulder: \par abd to 130 deg, ER to 85, IR to 40 deg\par +empty can\par sensation intact in bilateral UE\par 5/5 LUE\par

## 2021-02-09 NOTE — ASSESSMENT
[FreeTextEntry1] : She will f/u for xray neck since cannot get radiation to Diabetes monitoring device.\par \par Corrected shoulder ER exercise.  Taught shoulder abd and stretch. \par \par Taught PF for back and balance.  Will do right MBB.

## 2021-02-19 ENCOUNTER — APPOINTMENT (OUTPATIENT)
Dept: PHYSICAL MEDICINE AND REHAB | Facility: CLINIC | Age: 71
End: 2021-02-19
Payer: MEDICARE

## 2021-02-19 PROCEDURE — 64494 INJ PARAVERT F JNT L/S 2 LEV: CPT | Mod: RT

## 2021-02-19 PROCEDURE — 72040 X-RAY EXAM NECK SPINE 2-3 VW: CPT

## 2021-02-19 PROCEDURE — 64493 INJ PARAVERT F JNT L/S 1 LEV: CPT | Mod: RT

## 2021-02-23 ENCOUNTER — NON-APPOINTMENT (OUTPATIENT)
Age: 71
End: 2021-02-23

## 2021-02-24 ENCOUNTER — APPOINTMENT (OUTPATIENT)
Dept: PHYSICAL MEDICINE AND REHAB | Facility: CLINIC | Age: 71
End: 2021-02-24
Payer: MEDICARE

## 2021-02-24 PROCEDURE — 64483 NJX AA&/STRD TFRM EPI L/S 1: CPT | Mod: 50

## 2021-03-03 ENCOUNTER — APPOINTMENT (OUTPATIENT)
Dept: PHYSICAL MEDICINE AND REHAB | Facility: CLINIC | Age: 71
End: 2021-03-03
Payer: MEDICARE

## 2021-03-03 PROCEDURE — 20552 NJX 1/MLT TRIGGER POINT 1/2: CPT | Mod: 59

## 2021-03-03 PROCEDURE — 27096 INJECT SACROILIAC JOINT: CPT | Mod: RT

## 2021-03-08 RX ORDER — ACETAMINOPHEN AND CODEINE PHOSPHATE 60; 300 MG/1; MG/1
300-60 TABLET ORAL EVERY 8 HOURS
Qty: 21 | Refills: 0 | Status: ACTIVE | COMMUNITY
Start: 2021-03-08 | End: 1900-01-01

## 2021-03-16 ENCOUNTER — NON-APPOINTMENT (OUTPATIENT)
Age: 71
End: 2021-03-16

## 2021-04-02 ENCOUNTER — APPOINTMENT (OUTPATIENT)
Dept: PHYSICAL MEDICINE AND REHAB | Facility: CLINIC | Age: 71
End: 2021-04-02
Payer: MEDICARE

## 2021-04-02 DIAGNOSIS — M51.24 OTHER INTERVERTEBRAL DISC DISPLACEMENT, THORACIC REGION: ICD-10-CM

## 2021-04-02 DIAGNOSIS — H81.10 BENIGN PAROXYSMAL VERTIGO, UNSPECIFIED EAR: ICD-10-CM

## 2021-04-02 PROCEDURE — 99213 OFFICE O/P EST LOW 20 MIN: CPT

## 2021-04-02 NOTE — HISTORY OF PRESENT ILLNESS
[FreeTextEntry1] : Location: back\par Quality: sharp \par Severity: mild but still comes and goes; overall better\par Duration: few years \par Timing: recurrent \par Context: atraumatic \par Aggravating Factors: walking \par Alleviating Factors: KELLE; RF \par Associated Symptoms: no weakness; no weight loss; no fever; no chills; no change in bowel/bladder habits; numbness/tingling (tingling in legs); +radiation down leg\par Prior Studies: MRI 2021\par 12/2013 right MBB with Dr. Laurent\par 4/2014 right RFA with Dr. Laurent - significant relief\par 1/2015 left L3, L4, L5 MBB \par 2/2015 left RFA - significant relief\par 5/2016 right RFA - significant relief\par 12/2016 right L5 and S1 KELLE - moderate relief\par 11/2018 bilateral S1 KELEL - significant relief in legs and right back\par 12/2018 left L4/5 and L5/S1 facet injection\par 12/2018 left L3, L4, L5 RFA - no more pain with oblique ext\par 1/2019 left SI joint injection\par 7/2019 left SI joint injection\par 9/2019 right SI joint injection \par 10/2020 left SI joint injection\par 10/2020 right SI joint injection\par 1/2021 left L3, L4, L5 RFA\par 2/2021 bilateral L5 KELLE\par 3/2021 right SI joint injection\par \par Having left pectoralis pain lately.  Pain with lying on left at night.  Can do flyes in classes.

## 2021-04-02 NOTE — PHYSICAL EXAM
[FreeTextEntry1] : BEN is a 71 year yr old female \par \par Constitutional: healthy appearing, NAD, and obese\par \par BACK:\par TTP in SI joint bilat\par no swelling, erythema, warmth\par flexion to 30 deg, ext to 5 deg\par 5/5 bilat LE\par sensation intact in bilat LE\par walks with canes\par \par she palpated her chest and there is no pain\par no pain with resisted left arm adduction

## 2021-04-02 NOTE — ASSESSMENT
[FreeTextEntry1] : See cardiologist to r/o cardiac causes.  It has been over 1 yr since EKG.  Go to ER if chest pain comes back. \par \par Could not tolerate MRI.  Try Standup MRI. \par \par Cut down on narcotics.

## 2021-04-12 ENCOUNTER — APPOINTMENT (OUTPATIENT)
Dept: PHYSICAL MEDICINE AND REHAB | Facility: CLINIC | Age: 71
End: 2021-04-12
Payer: MEDICARE

## 2021-04-12 PROCEDURE — 99214 OFFICE O/P EST MOD 30 MIN: CPT

## 2021-04-12 NOTE — HISTORY OF PRESENT ILLNESS
[FreeTextEntry1] : Location: back\par Quality: sharp \par Severity: mild but still comes and goes; overall better\par Duration: few years \par Timing: recurrent \par Context: atraumatic \par Aggravating Factors: walking \par Alleviating Factors: KELLE; RF \par Associated Symptoms: no weakness; no weight loss; no fever; no chills; no change in bowel/bladder habits; numbness/tingling (tingling in legs); +radiation down leg\par Prior Studies: MRI 2021\par 12/2013 right MBB with Dr. Laurent\par 4/2014 right RFA with Dr. Laurent - significant relief\par 1/2015 left L3, L4, L5 MBB \par 2/2015 left RFA - significant relief\par 5/2016 right RFA - significant relief\par 12/2016 right L5 and S1 KELLE - moderate relief\par 11/2018 bilateral S1 KELLE - significant relief in legs and right back\par 12/2018 left L4/5 and L5/S1 facet injection\par 12/2018 left L3, L4, L5 RFA - no more pain with oblique ext\par 1/2019 left SI joint injection\par 7/2019 left SI joint injection\par 9/2019 right SI joint injection \par 10/2020 left SI joint injection\par 10/2020 right SI joint injection\par 1/2021 left L3, L4, L5 RFA\par 2/2021 bilateral L5 KELLE\par 3/2021 right SI joint injection\par \par \par Left shoulder got worse after doing reverse flies with bands.  She felt a pull in the shoulder.  Pain with lifting arm and twisting.  No pain down arm.  Xray 9/2020.

## 2021-04-12 NOTE — ASSESSMENT
[FreeTextEntry1] : MRI thoracic report not back yet.  Will call her when results are in. \par \par Stop exercise classes involving left shoulder for now and start PT.  \par \par Corrected hip abd.  Do more bridges.  Continue piriformis stretch.  \par \par Walk with feet 6 inches apart.  Avoid crossing legs to avoid falls. \par \par f/u for shd inj anytime

## 2021-04-12 NOTE — PHYSICAL EXAM
[FreeTextEntry1] : BEN is a 71 year yr old female \par \par Constitutional: healthy appearing, NAD, and obese\par \par BACK:\par TTP in SI joint bilat\par no swelling, erythema, warmth\par flexion to 30 deg, ext to 5 deg\par 5/5 bilat LE\par sensation intact in bilat LE\par walks with canes\par \par left shoulder:\par abd to 90 deg, ER to 70, IR to 30 \par +empty can, Hawkin\par 5/5 left elb flex, WE

## 2021-04-13 ENCOUNTER — NON-APPOINTMENT (OUTPATIENT)
Age: 71
End: 2021-04-13

## 2021-04-13 RX ORDER — METHYLPREDNISOLONE 4 MG/1
4 TABLET ORAL
Qty: 1 | Refills: 0 | Status: ACTIVE | COMMUNITY
Start: 2021-04-13 | End: 1900-01-01

## 2021-06-11 ENCOUNTER — APPOINTMENT (OUTPATIENT)
Dept: PHYSICAL MEDICINE AND REHAB | Facility: CLINIC | Age: 71
End: 2021-06-11
Payer: MEDICARE

## 2021-06-11 PROCEDURE — 20552 NJX 1/MLT TRIGGER POINT 1/2: CPT

## 2021-06-11 PROCEDURE — 99214 OFFICE O/P EST MOD 30 MIN: CPT | Mod: 25

## 2021-06-11 NOTE — HISTORY OF PRESENT ILLNESS
[FreeTextEntry1] : Location: back\par Quality: sharp \par Severity: mild but still comes and goes; overall better\par Duration: few years \par Timing: recurrent \par Context: atraumatic \par Aggravating Factors: walking \par Alleviating Factors: KELLE; RF \par Associated Symptoms: no weakness; no weight loss; no fever; no chills; no change in bowel/bladder habits; numbness/tingling (tingling in legs); +radiation down leg\par Prior Studies: MRI 2021\par 12/2013 right MBB with Dr. Laurent\par 4/2014 right RFA with Dr. Laurent - significant relief\par 1/2015 left L3, L4, L5 MBB \par 2/2015 left RFA - significant relief\par 5/2016 right RFA - significant relief\par 12/2016 right L5 and S1 KELLE - moderate relief\par 11/2018 bilateral S1 KELLE - significant relief in legs and right back\par 12/2018 left L4/5 and L5/S1 facet injection\par 12/2018 left L3, L4, L5 RFA - no more pain with oblique ext\par 1/2019 left SI joint injection\par 7/2019 left SI joint injection\par 9/2019 right SI joint injection \par 10/2020 left SI joint injection\par 10/2020 right SI joint injection\par 1/2021 left L3, L4, L5 RFA\par 2/2021 bilateral L5 KELLE\par 3/2021 right SI joint injection\par \par \par Left shoulder got worse after doing reverse flies with bands. She felt a pull in the shoulder. Pain with lifting arm and twisting. No pain down arm. Xray 9/2020. Neck also hurting lately. Pain with rotation.  6/10.\par

## 2021-06-11 NOTE — PHYSICAL EXAM
[FreeTextEntry1] : BEN is a 71 year yr old female \par \par Constitutional: healthy appearing, NAD, and obese\par \par BACK:\par TTP in SI joint bilat\par no swelling, erythema, warmth\par flexion to 30 deg, ext to 5 deg\par 5/5 bilat LE\par sensation intact in bilat LE\par walks with canes\par \par left shoulder:\par abd to 150 deg, ER to 80, IR to 40 \par +empty can, Hawkin\par 5/5 bilateral elb flex/ext, WE, finger flex and left finger abd, 4 right finger abd

## 2021-06-11 NOTE — PROCEDURE
[de-identified] : Indication: myofascial pain\par \par After informed consent,she elected to proceed with a trigger point injection into the bilateral trapezius. I confirmed no prior adverse reactions, no active infections, and no relevant allergies. \par  \par The skin was prepped in the usual sterile manner. The muscles were pinched and elevated from the chest wall to avoid puncturing the lung. The sites were injected with local anesthetic followed by local needling. The injection was completed without complication and a bandage was applied. She tolerated the procedure well and was given post-injection instructions. \par  \par Cold Tx x 48 hours, analgesics prn. Medications: 0.5 ml of 1% Lidocaine per site\par \par \par \par

## 2021-06-11 NOTE — ASSESSMENT
[FreeTextEntry1] : shoulder - ROM improved but still painful; does not want injection yet; corrected RTC exercise\par \par stenosis - see Dr Mata for possible surgery since plastic surgeon for arms recommended spine surgery first\par \par SI - consider left SI joint injection\par \par lumbar - does not want right RFA yet

## 2021-06-22 ENCOUNTER — NON-APPOINTMENT (OUTPATIENT)
Age: 71
End: 2021-06-22

## 2021-06-29 ENCOUNTER — APPOINTMENT (OUTPATIENT)
Dept: ORTHOPEDIC SURGERY | Facility: CLINIC | Age: 71
End: 2021-06-29
Payer: MEDICARE

## 2021-06-29 DIAGNOSIS — M54.12 RADICULOPATHY, CERVICAL REGION: ICD-10-CM

## 2021-06-29 DIAGNOSIS — M54.9 DORSALGIA, UNSPECIFIED: ICD-10-CM

## 2021-06-29 DIAGNOSIS — G89.29 DORSALGIA, UNSPECIFIED: ICD-10-CM

## 2021-06-29 PROCEDURE — 99204 OFFICE O/P NEW MOD 45 MIN: CPT

## 2021-06-29 PROCEDURE — 72110 X-RAY EXAM L-2 SPINE 4/>VWS: CPT

## 2021-06-29 PROCEDURE — 72050 X-RAY EXAM NECK SPINE 4/5VWS: CPT

## 2021-06-29 PROCEDURE — 72070 X-RAY EXAM THORAC SPINE 2VWS: CPT

## 2021-06-29 RX ORDER — CYCLOBENZAPRINE HYDROCHLORIDE 5 MG/1
5 TABLET, FILM COATED ORAL 3 TIMES DAILY
Qty: 42 | Refills: 1 | Status: ACTIVE | COMMUNITY
Start: 2021-06-29 | End: 1900-01-01

## 2021-07-26 ENCOUNTER — APPOINTMENT (OUTPATIENT)
Dept: PHYSICAL MEDICINE AND REHAB | Facility: CLINIC | Age: 71
End: 2021-07-26
Payer: MEDICARE

## 2021-07-26 PROBLEM — M54.12 CERVICAL RADICULITIS: Status: ACTIVE | Noted: 2019-10-23

## 2021-07-26 PROBLEM — M54.9 CHRONIC UPPER BACK PAIN: Status: ACTIVE | Noted: 2021-07-26

## 2021-07-26 PROCEDURE — 20611 DRAIN/INJ JOINT/BURSA W/US: CPT | Mod: LT

## 2021-07-26 PROCEDURE — 99214 OFFICE O/P EST MOD 30 MIN: CPT | Mod: 25

## 2021-07-26 PROCEDURE — 20552 NJX 1/MLT TRIGGER POINT 1/2: CPT | Mod: 59

## 2021-07-26 RX ORDER — CLINDAMYCIN HYDROCHLORIDE 150 MG/1
150 CAPSULE ORAL
Qty: 21 | Refills: 0 | Status: ACTIVE | COMMUNITY
Start: 2021-02-09

## 2021-07-26 RX ORDER — SEMAGLUTIDE 1.34 MG/ML
4 INJECTION, SOLUTION SUBCUTANEOUS
Qty: 3 | Refills: 0 | Status: ACTIVE | COMMUNITY
Start: 2021-06-15

## 2021-07-26 NOTE — PROCEDURE
[de-identified] : Ultrasound Guided Left Shoulder Injection\par \par After discussion of the risks and benefits, she  elected to proceed with a corticosteroid injection into the subacromial space.\par \par Confirmed that the patient does not have history of prior adverse reactions, active infections, or relevant allergies. There was no effusion, erythema, or warmth, and the skin was clear.\par \par The skin was sterilized and then anesthetized with 1% Lidocaine. Under routine sterile technique, the site was injected with a mixture of 3 ml of 1% Lidocaine and 20 mg of Kenalog using ultrasound guidance. The injection was completed without complication and a bandage was applied.   \par \par She tolerated the procedure well and was given post-injection instructions.Rec: Cold therapy, analgesics, avoid heavy activity.\par \par Indication: myofascial pain\par \par After informed consent,she elected to proceed with a trigger point injection into the cervical paraspinals bilaterally. I confirmed no prior adverse reactions, no active infections, and no relevant allergies. \par  \par The skin was prepped in the usual sterile manner. The muscles were pinched and elevated from the chest wall to avoid puncturing the lung. The sites were injected with local anesthetic followed by local needling. The injection was completed without complication and a bandage was applied. She tolerated the procedure well and was given post-injection instructions. \par  \par Cold Tx x 48 hours, analgesics prn. Medications: 0.5 ml of 1% Lidocaine per site\par \par \par

## 2021-07-26 NOTE — PHYSICAL EXAM
[FreeTextEntry1] : BEN is a 71 year yr old female \par \par Constitutional: healthy appearing, NAD, and obese\par \par BACK:\par TTP in SI joint bilat\par no swelling, erythema, warmth\par flexion to 30 deg, ext to 5 deg\par 5/5 bilat LE\par sensation intact in bilat LE\par walks with canes\par \par left shoulder:\par abd to 90 deg, ER to 80, IR to 40 \par +empty can, Hawkin\par 5/5 bilateral elb flex/ext, WE, finger flex and finger abd\par \par NECK\par rotation to 45 deg bilat\par TTP in cervical paraspinals

## 2021-07-26 NOTE — HISTORY OF PRESENT ILLNESS
[FreeTextEntry1] : Location: back\par Quality: sharp \par Severity: mild \par Duration: few years \par Timing: recurrent \par Context: atraumatic \par Aggravating Factors: walking \par Alleviating Factors: KLELE; RF \par Associated Symptoms: no weakness; no weight loss; no fever; no chills; no change in bowel/bladder habits; numbness/tingling (tingling in legs); +radiation down leg\par Prior Studies: MRI 2021\par 12/2013 right MBB with Dr. Laurent\par 4/2014 right RFA with Dr. Laurent - significant relief\par 1/2015 left L3, L4, L5 MBB \par 2/2015 left RFA - significant relief\par 5/2016 right RFA - significant relief\par 12/2016 right L5 and S1 KELLE - moderate relief\par 11/2018 bilateral S1 KELLE - significant relief in legs and right back\par 12/2018 left L4/5 and L5/S1 facet injection\par 12/2018 left L3, L4, L5 RFA - no more pain with oblique ext\par 1/2019 left SI joint injection\par 7/2019 left SI joint injection\par 9/2019 right SI joint injection \par 10/2020 left SI joint injection\par 10/2020 right SI joint injection\par 1/2021 left L3, L4, L5 RFA\par 2/2021 bilateral L5 KELLE\par 3/2021 right SI joint injection\par \par \par Left shoulder got worse after doing reverse flies with bands. She felt a pull in the shoulder. Pain with lifting arm and twisting. No pain down arm. Xray 9/2020.  Pain is severe.  Neck also hurting lately. Pain with rotation.\par \par

## 2021-07-26 NOTE — REVIEW OF SYSTEMS
[Negative] : Heme/Lymph [FreeTextEntry9] : neck pain, upper back pain, low back pain radiating to her legs

## 2021-07-26 NOTE — PHYSICAL EXAM
[de-identified] : General: No acute distress, conversant, well-nourished.\par Head: Normocephalic, atraumatic\par Neck: trachea midline, FROM\par Heart: normotensive and normal rate and rhythm\par Lungs: No labored breathing\par Skin: No abrasions, no rashes, no edema\par Psych: Alert and oriented to person, place and time\par Extremities: no peripheral edema or digital cyanosis\par Gait: Normal gait. Can perform tandem gait.  \par Vascular: warm and well perfused distally, palpable distal pulses\par \par MSK:\par Spine: \par No tenderness to palpation.  No step-off, no deformity.\par \par NEURO:\par Sensation \par          Left           \par C5     2/2               \par C6     2/2               \par C7     2/2               \par C8     2/2              \par T1     2/2             \par \par          Right         \par C5     2/2               \par C6     2/2               \par C7     2/2               \par C8     2/2              \par T1     2/2      \par \par Motor: \par                                                Left             \par C5 (deltoid abduction)             5/5               \par C6 (biceps flexion)                   5/5                \par C7 (triceps extension)             5/5               \par C8 (finger flexion)                     5/5               \par T1 (interosseous)                     5/5           \par \par                                                Right           \par C5 (deltoid abduction)             5/5               \par C6 (biceps flexion)                   5/5                \par C7 (triceps extension)             5/5               \par C8 (finger flexion)                     5/5               \par T1 (interosseous)                     5/5                     \par \par Sensation \par Left L2  -  2/2            \par Left L3  -  2/2\par Left L4  -  2/2\par Left L5  -  2/2\par Left S1  -  2/2\par \par Right L2  -  2/2            \par Right L3  -  2/2\par Right L4  -  2/2\par Right L5  -  2/2\par Right S1  -  2/2\par \par Motor: \par Left L2 (hip flexion)                            5/5                \par Left L3 (knee extension)                   5/5                \par Left L4 (ankle dorsiflexion)                 5/5                \par Left L5 (long toe extensor)                5/5                \par Left S1 (ankle plantar flexion)           5/5\par \par Right L2 (hip flexion)                            5/5                \par Right L3 (knee extension)                   5/5                \par Right L4 (ankle dorsiflexion)                 5/5                \par Right L5 (long toe extensor)                5/5                \par Right S1 (ankle plantar flexion)           5/5\par \par Reflexes: Normal and symmetric\par Negative Spurling’s test.  Negative Gaming’s reflex.  \par Negative clonus.  Down-going Babinski. [de-identified] : I ordered cervical, thoracic and lumbar radiographs to evaluate the patient's symptoms.\par \par Cervical 4 view radiographs obtained in the office today shows no fracture or dislocation. Cervical spondylosis with multilevel loss of disc height and anterior osteophyte formation most pronounced C5-C7.  Loss of cervical lordosis.  No instability on dynamic series.\par \par Thoracic 2 view radiographs obtained in the office today shows no fracture or dislocation.  Degenerative changes.\par \par Lumbar 4 view radiographs obtained in the office today shows no fracture or dislocation.  Degenerative scoliosis.  Lumbar spondylosis.  No instability on dynamic series.  \par \par Lumbar MRI (3/12/21): Mild lumbar dextroscoliosis is redemonstrated. Slight anterolisthesis at L5-S1 is redemonstrated. Vertebral body heights are maintained. Multilevel discogenic degenerative endplate changes are redemonstrated, including endplate osteophyte formations.\par \par L5-S1: A disc bulge and posterior element hypertrophy are redemonstrated. Right greater than left foraminal narrowing is unchanged, with impingement of the exiting right L5 nerve root. Bilateral lateral recess stenosis and mild central canal stenosis are redemonstrated. Again demonstrated is a 4 mm T2 hyperintense synovial cyst along the medial aspect of the left facet joint (axial T2-weighted sequence image 25).\par \par L4-L5: A disc bulge and posterior element hypertrophy are redemonstrated. Moderate central canal stenosis is stable. A superimposed small left paracentral disc herniation again approximates the traversing left L5 nerve root. Bilateral foraminal narrowing is stable.\par \par L3-L4: A disc bulge and posterior element hypertrophy are redemonstrated. There is stable moderate central canal stenosis. Bilateral foraminal narrowing is unchanged.\par \par L2-L3: A disc bulge and posterior element hypertrophy are redemonstrated. There is stable mild-moderate central canal stenosis. Bilateral foraminal narrowing is stable.\par \par L1-L2: A broad-based central disc herniation superimposed upon a disc bulge is redemonstrated as well as posterior element hypertrophy. Moderate central canal stenosis is redemonstrated. Left foraminal narrowing is stable.\par \par Multilevel degenerative disks at the lower thoracic spine are again partially demonstrated on sagittal imaging. An inferiorly extruded central/paracentral disc herniation at T11-T12 appears to be progressed when compared to the prior examination. Contact is made with the adjacent ventral spinal cord.\par \par The conus medullaris is normal in morphology, signal characteristics, and location.  No intradural abnormality is demonstrated. The visualized portions of the sacrum and sacroiliac joints are unremarkable.\par

## 2021-07-26 NOTE — ASSESSMENT
[FreeTextEntry1] : 71 year old female presents with acute exacerbation of chronic neck, upper back and low back pain.  She has pain radiating to her legs.  She is otherwise neurologically intact.  The back pain is what bothers her the most.  We reviewed her radiographs and MRI.  We discussed treatment options including surgery, continued spinal injections, medications and PT.  She was given a prescription for cyclobenzaprine.  She will continue with spinal injections.  She will followup in 1-2 months.  We discussed red flag symptoms that would require emergent evaluation. She knows to call with any questions or concerns or if her symptoms acutely worsen.

## 2021-07-26 NOTE — ASSESSMENT
[FreeTextEntry1] : Consider cervical facet injections.  Gave exercises for neck since ran out of PT.\par \par Do HEP for shoulder. Check glucose for the next 1 wk and adjust meds.  Call PCP if too high.\par \par f/u for TPI upper back\par \par Consider right RFA and KELLE.

## 2021-07-26 NOTE — HISTORY OF PRESENT ILLNESS
[de-identified] : 71 year old female presents with acute exacerbation of chronic neck, upper back and low back pain.  She describes pain radiating down her legs.  She has paresthesias.  She has tried epidural steroid injections, physical therapy and medications without relief.  She denies recent illness, fevers, numbness, weakness, balance problems, saddle anesthesia, urinary retention or fecal incontinence.  The back pain is what bothers her the most.

## 2021-08-04 ENCOUNTER — APPOINTMENT (OUTPATIENT)
Dept: PHYSICAL MEDICINE AND REHAB | Facility: CLINIC | Age: 71
End: 2021-08-04
Payer: MEDICARE

## 2021-08-04 DIAGNOSIS — M50.20 OTHER CERVICAL DISC DISPLACEMENT, UNSPECIFIED CERVICAL REGION: ICD-10-CM

## 2021-08-04 PROCEDURE — 20552 NJX 1/MLT TRIGGER POINT 1/2: CPT

## 2021-08-04 PROCEDURE — 99213 OFFICE O/P EST LOW 20 MIN: CPT | Mod: 25

## 2021-08-04 NOTE — HISTORY OF PRESENT ILLNESS
[FreeTextEntry1] : Location: back\par Quality: sharp \par Severity: 4/10 but gets worse with walking \par Duration: few years \par Timing: recurrent \par Context: atraumatic \par Aggravating Factors: walking \par Alleviating Factors: KELLE; RF \par Associated Symptoms: no weakness; no weight loss; no fever; no chills; no change in bowel/bladder habits; numbness/tingling (tingling in legs); +radiation down leg\par Prior Studies: MRI 2021\par 12/2013 right MBB with Dr. Laurent\par 4/2014 right RFA with Dr. Laurent - significant relief\par 1/2015 left L3, L4, L5 MBB \par 2/2015 left RFA - significant relief\par 5/2016 right RFA - significant relief\par 12/2016 right L5 and S1 KELLE - moderate relief\par 11/2018 bilateral S1 KELLE - significant relief in legs and right back\par 12/2018 left L4/5 and L5/S1 facet injection\par 12/2018 left L3, L4, L5 RFA - no more pain with oblique ext\par 1/2019 left SI joint injection\par 7/2019 left SI joint injection\par 9/2019 right SI joint injection \par 10/2020 left SI joint injection\par 10/2020 right SI joint injection\par 1/2021 left L3, L4, L5 RFA\par 2/2021 bilateral L5 KELLE\par 3/2021 right SI joint injection\par \par \par Left shoulder better after injection but still hurts. Pain with lifting arm and twisting. No pain down arm. Xray 9/2020. Pain is severe. Neck also hurting still. Pain with rotation.\par 7/2021 left subacromial injection\par

## 2021-08-04 NOTE — PROCEDURE
[de-identified] : Indication: myofascial pain\par \par After informed consent,she elected to proceed with a trigger point injection into the rhomboids bilaterally. I confirmed no prior adverse reactions, no active infections, and no relevant allergies. \par  \par The skin was prepped in the usual sterile manner. The muscles were pinched and elevated from the chest wall to avoid puncturing the lung. The sites were injected with local anesthetic followed by local needling. The injection was completed without complication and a bandage was applied. She tolerated the procedure well and was given post-injection instructions. \par  \par Cold Tx x 48 hours, analgesics prn. Medications: 0.5 ml of 1% Lidocaine per site\par \par \par \par

## 2021-08-04 NOTE — PHYSICAL EXAM
[FreeTextEntry1] : BEN is a 71 year yr old female \par \par Constitutional: healthy appearing, NAD, and obese\par \par BACK:\par TTP in SI joint bilat\par no swelling, erythema, warmth\par flexion to 30 deg, ext to 5 deg\par 5/5 bilat LE\par sensation intact in bilat LE\par walks with canes\par \par left shoulder:\par abd to 170 deg, ER to 80, IR to 40 \par +empty can, [place]\par 5/5 bilateral elb flex/ext, WE, finger flex and finger abd\par \par NECK\par rotation to 45 deg bilat\par TTP in cervical paraspinals\par

## 2021-08-04 NOTE — ASSESSMENT
[FreeTextEntry1] : Discussed MBB vs KELLE.  Will do right MBB.  Walk a lot before coming in.\par \par

## 2021-09-16 ENCOUNTER — OUTPATIENT (OUTPATIENT)
Dept: OUTPATIENT SERVICES | Facility: HOSPITAL | Age: 71
LOS: 1 days | End: 2021-09-16

## 2021-09-16 ENCOUNTER — APPOINTMENT (OUTPATIENT)
Dept: OPHTHALMOLOGY | Facility: CLINIC | Age: 71
End: 2021-09-16

## 2021-09-17 DIAGNOSIS — H40.003 PREGLAUCOMA, UNSPECIFIED, BILATERAL: ICD-10-CM

## 2021-09-18 ENCOUNTER — TRANSCRIPTION ENCOUNTER (OUTPATIENT)
Age: 71
End: 2021-09-18

## 2021-10-08 ENCOUNTER — APPOINTMENT (OUTPATIENT)
Dept: PHYSICAL MEDICINE AND REHAB | Facility: CLINIC | Age: 71
End: 2021-10-08
Payer: MEDICARE

## 2021-10-08 PROCEDURE — 99213 OFFICE O/P EST LOW 20 MIN: CPT

## 2021-10-08 NOTE — PHYSICAL EXAM
[FreeTextEntry1] : BEN is a 71 year yr old female \par \par Constitutional: healthy appearing, NAD, and obese\par \par BACK:\par TTP in SI joint bilat\par no swelling, erythema, warmth\par flexion to 30 deg, ext to 5 deg\par 5/5 bilat LE\par sensation intact in bilat LE\par walks with canes\par \par ELBOWS\par no erythema, warmth, swelling\par no TTP in elbows\par neg Tinel at elbow\par \par

## 2021-10-08 NOTE — HISTORY OF PRESENT ILLNESS
[FreeTextEntry1] : Location: back\par Quality: sharp \par Severity: 4/10 but gets worse with walking \par Duration: few years \par Timing: recurrent \par Context: atraumatic \par Aggravating Factors: walking \par Alleviating Factors: KELLE; RF \par Associated Symptoms: no weakness; no weight loss; no fever; no chills; no change in bowel/bladder habits; numbness/tingling (tingling in legs); +radiation down leg, abd pain\par Prior Studies: MRI 2021\par 12/2013 right MBB with Dr. Laurent\par 4/2014 right RFA with Dr. Laurent - significant relief\par 1/2015 left L3, L4, L5 MBB \par 2/2015 left RFA - significant relief\par 5/2016 right RFA - significant relief\par 12/2016 right L5 and S1 KELLE - moderate relief\par 11/2018 bilateral S1 KELLE - significant relief in legs and right back\par 12/2018 left L4/5 and L5/S1 facet injection\par 12/2018 left L3, L4, L5 RFA - no more pain with oblique ext\par 1/2019 left SI joint injection\par 7/2019 left SI joint injection\par 9/2019 right SI joint injection \par 10/2020 left SI joint injection\par 10/2020 right SI joint injection\par 1/2021 left L3, L4, L5 RFA\par 2/2021 bilateral L5 KELLE\par 3/2021 right SI joint injection\par \par Elbows hurting lately.  Not painful to touch but pain when leaning on them.  No numbness, weakness.

## 2021-10-08 NOTE — ASSESSMENT
[FreeTextEntry1] : elbow pain worsening - avoid leaning on elbows\par ice area often\par if not better see Dr Box\par \par disc and spondylosis - Discussed MBB vs KELLE.\par \par SI - consider repeat SI injection\par \par see PCP for abd pain\par \par f/u 1-2 months or sooner for injections

## 2021-10-21 ENCOUNTER — APPOINTMENT (OUTPATIENT)
Dept: PHYSICAL MEDICINE AND REHAB | Facility: CLINIC | Age: 71
End: 2021-10-21
Payer: MEDICARE

## 2021-10-21 DIAGNOSIS — M25.521 PAIN IN RIGHT ELBOW: ICD-10-CM

## 2021-10-21 DIAGNOSIS — M25.522 PAIN IN RIGHT ELBOW: ICD-10-CM

## 2021-10-21 PROCEDURE — 73030 X-RAY EXAM OF SHOULDER: CPT | Mod: LT

## 2021-10-21 PROCEDURE — 20611 DRAIN/INJ JOINT/BURSA W/US: CPT | Mod: LT

## 2021-10-21 PROCEDURE — 99213 OFFICE O/P EST LOW 20 MIN: CPT | Mod: 25

## 2021-10-21 NOTE — ASSESSMENT
[FreeTextEntry1] : elbows - she has not iced it yet; try it and if not better see Dr Box\par \par coccyx - use coccyx cushion in new chair\par ice area often\par if not better discussed injection.  Risks including bleeding, infection.\par \par disc and spondylosis - Discussed MBB vs KELLE.\par reviewed exercises to do including crunches\par \par She  denies being sick or having f/c, SOB, cough.  She  understands risk of immunosuppression from steroids.  Do not get covid vaccine for 2 wk.\par \par f/u 1 month

## 2021-10-21 NOTE — HISTORY OF PRESENT ILLNESS
[FreeTextEntry1] : Location: back\par Quality: sharp \par Severity: 4/10 but gets worse with walking \par Duration: few years \par Timing: recurrent \par Context: atraumatic \par Aggravating Factors: walking \par Alleviating Factors: KELLE; RF \par Associated Symptoms: no weakness; no weight loss; no fever; no chills; no change in bowel/bladder habits; numbness/tingling (tingling in legs); +radiation down leg, abd pain\par Prior Studies: MRI 2021\par 12/2013 right MBB with Dr. Laurent\par 4/2014 right RFA with Dr. Laurent - significant relief\par 1/2015 left L3, L4, L5 MBB \par 2/2015 left RFA - significant relief\par 5/2016 right RFA - significant relief\par 12/2016 right L5 and S1 KELLE - moderate relief\par 11/2018 bilateral S1 KELLE - significant relief in legs and right back\par 12/2018 left L4/5 and L5/S1 facet injection\par 12/2018 left L3, L4, L5 RFA - no more pain with oblique ext\par 1/2019 left SI joint injection\par 7/2019 left SI joint injection\par 9/2019 right SI joint injection \par 10/2020 left SI joint injection\par 10/2020 right SI joint injection\par 1/2021 left L3, L4, L5 RFA\par 2/2021 bilateral L5 KELLE\par 3/2021 right SI joint injection\par \par Elbows hurting still.  Not leaning on them anymore. Not painful to touch but pain when leaning on them. No numbness, weakness. \par \par Left shoulder hurting lately.  Pain with lifting.  Better with rest.  No pain down arms.

## 2021-10-21 NOTE — DATA REVIEWED
[FreeTextEntry1] : Office x-rays of the left shoulder AP and outlet show mild roughening under the acromium and mild AC joint arthritis.

## 2021-10-21 NOTE — PROCEDURE
[de-identified] : indication: pain\par \par Ultrasound Guided Left Shoulder Injection\par \par After discussion of the risks and benefits, she  elected to proceed with a corticosteroid injection into the subacromial space.\par \par Confirmed that the patient does not have history of prior adverse reactions, active infections, or relevant allergies. There was no effusion, erythema, or warmth, and the skin was clear.\par \par The skin was sterilized and then anesthetized with 1% Lidocaine. Under routine sterile technique, the site was injected with a mixture of 3 ml of 1% Lidocaine and 20 mg of Kenalog using ultrasound guidance. The injection was completed without complication and a bandage was applied.   \par \par She tolerated the procedure well and was given post-injection instructions.Rec: Cold therapy, analgesics, avoid heavy activity.\par \par Manufacture [employer]\par Kenalog 40 \par NDC [id number]\par Exp 2/23\par LOT [id number]\par \par Lidocaine 1%\par Exp 5/2023\par Manufacture: Hikma\par NDC 4925-4263-70\par [id number]\par

## 2021-10-21 NOTE — PHYSICAL EXAM
[FreeTextEntry1] : BEN is a 71 year yr old female \par \par Constitutional: healthy appearing, NAD, and obese\par \par BACK:\par TTP in SI joint bilat\par no swelling, erythema, warmth\par flexion to 30 deg, ext to 5 deg\par 5/5 bilat LE\par sensation intact in bilat LE\par walks with canes\par \par ELBOWS\par no erythema, warmth, swelling\par no TTP in elbows\par \par left shoulder:\par abd to 150 deg, ER to 70, IR to 30\par +empty can\par

## 2021-11-05 ENCOUNTER — NON-APPOINTMENT (OUTPATIENT)
Age: 71
End: 2021-11-05

## 2021-11-23 ENCOUNTER — APPOINTMENT (OUTPATIENT)
Dept: PHYSICAL MEDICINE AND REHAB | Facility: CLINIC | Age: 71
End: 2021-11-23
Payer: MEDICARE

## 2021-11-23 PROCEDURE — 64450 NJX AA&/STRD OTHER PN/BRANCH: CPT

## 2021-11-23 PROCEDURE — 64999 UNLISTED PX NERVOUS SYSTEM: CPT

## 2021-12-13 ENCOUNTER — APPOINTMENT (OUTPATIENT)
Dept: PHYSICAL MEDICINE AND REHAB | Facility: CLINIC | Age: 71
End: 2021-12-13
Payer: MEDICARE

## 2021-12-13 PROCEDURE — 99213 OFFICE O/P EST LOW 20 MIN: CPT | Mod: 25

## 2021-12-13 PROCEDURE — 20552 NJX 1/MLT TRIGGER POINT 1/2: CPT

## 2021-12-13 RX ORDER — PREGABALIN 225 MG/1
225 CAPSULE ORAL
Qty: 60 | Refills: 0 | Status: ACTIVE | COMMUNITY
Start: 2021-10-14

## 2021-12-13 NOTE — PHYSICAL EXAM
[FreeTextEntry1] : BEN is a 71 year yr old female \par \par Constitutional: healthy appearing, NAD, and obese\par \par BACK:\par TTP in SI joint bilat\par no swelling, erythema, warmth\par flexion to 30 deg, ext to 5 deg\par 5/5 bilat LE\par sensation intact in bilat LE\par walks with canes\par TTP in right gluteus bianca \par \par left shoulder:\par abd to 170 deg, ER to 70, IR to 30\par +empty can\par

## 2021-12-13 NOTE — ASSESSMENT
[FreeTextEntry1] : coccyx - use coccyx cushion\par limit sitting\par avoid slouching\par ice area often\par \par stenosis - \par needs to do more crunches\par having microlaminotomy next Tues with Dr Mak Perales\par \par shoulder - reviewed stretches to do\par do HEP\par \par f/u 1 month

## 2021-12-13 NOTE — PROCEDURE
[de-identified] : Indication: myofascial pain\par \par After informed consent,she elected to proceed with a trigger point injection into the right gluteus bianca in 2 spots. I confirmed no prior adverse reactions, no active infections, and no relevant allergies. \par  \par The skin was prepped in the usual sterile manner.  The sites were injected with Lidocaine followed by local needling. The injection was completed without complication and a bandage was applied. She tolerated the procedure well and was given post-injection instructions. \par  \par Cold Tx x 48 hours, analgesics prn. Medications: 0.5 ml of 1% Lidocaine per site\par \par \par Exp 5/2023\par Manufacture: Hikma\par NDC 4718-0557-74\par [id number]\par

## 2021-12-13 NOTE — HISTORY OF PRESENT ILLNESS
[FreeTextEntry1] : Location: back\par Quality: sharp \par Severity: 7/10 \par Duration: few years \par Timing: recurrent \par Context: atraumatic \par Aggravating Factors: walking \par Alleviating Factors: KELLE; RF \par Associated Symptoms: no weakness; no weight loss; no fever; no chills; no change in bowel/bladder habits; numbness/tingling (tingling in legs); +radiation down leg, abd pain\par Prior Studies: MRI 2021\par 12/2013 right MBB with Dr. Laurent\par 4/2014 right RFA with Dr. Laurent - significant relief\par 1/2015 left L3, L4, L5 MBB \par 2/2015 left RFA - significant relief\par 5/2016 right RFA - significant relief\par 12/2016 right L5 and S1 KELLE - moderate relief\par 11/2018 bilateral S1 KELLE - significant relief in legs and right back\par 12/2018 left L4/5 and L5/S1 facet injection\par 12/2018 left L3, L4, L5 RFA - no more pain with oblique ext\par 1/2019 left SI joint injection\par 7/2019 left SI joint injection\par 9/2019 right SI joint injection \par 10/2020 left SI joint injection\par 10/2020 right SI joint injection\par 1/2021 left L3, L4, L5 RFA\par 2/2021 bilateral L5 KELLE\par 3/2021 right SI joint injection\par 11/2021 ganglion impars block and coccyx injection - significant relief\par \par \par Left shoulder hurting still.  Injection helped.  Pain with lifting. Better with rest. No pain down arms. \par xray 10/21\par 10/2021 left subacromial injection

## 2021-12-15 RX ORDER — GABAPENTIN 600 MG/1
600 TABLET, COATED ORAL
Qty: 120 | Refills: 0 | Status: DISCONTINUED | COMMUNITY
Start: 2020-12-02 | End: 2021-12-15

## 2021-12-17 ENCOUNTER — APPOINTMENT (OUTPATIENT)
Dept: PHYSICAL MEDICINE AND REHAB | Facility: CLINIC | Age: 71
End: 2021-12-17
Payer: MEDICARE

## 2021-12-17 PROCEDURE — 20552 NJX 1/MLT TRIGGER POINT 1/2: CPT

## 2021-12-17 PROCEDURE — 99213 OFFICE O/P EST LOW 20 MIN: CPT | Mod: 25

## 2021-12-17 NOTE — PHYSICAL EXAM
[FreeTextEntry1] : BEN is a 71 year yr old female \par \par Constitutional: healthy appearing, NAD, and obese\par \par \par \par left shoulder:\par abd to 70 deg, ER to 50, IR to 10\par passive abd to 90 \par +empty can\par 5/5 bilat UE

## 2021-12-17 NOTE — HISTORY OF PRESENT ILLNESS
[FreeTextEntry1] : Left shoulder got worse a few days ago.  She could not move it.  It has gotten a bit better with rest. Injection helped in the past. Pain with lifting. Better with rest. Pain down left upper arm. \par xray 10/21\par 10/2021 left subacromial injection \par

## 2021-12-17 NOTE — PROCEDURE
[de-identified] : Indication: myofascial pain\par \par After informed consent,she elected to proceed with a trigger point injection into the left deltoid in 2 spots. I confirmed no prior adverse reactions, no active infections, and no relevant allergies. \par  \par The skin was prepped in the usual sterile manner.  The sites were injected with Lidocaine followed by local needling. The injection was completed without complication and a bandage was applied. She tolerated the procedure well and was given post-injection instructions. \par  \par Cold Tx x 48 hours, analgesics prn. Medications: 0.5 ml of 1% Lidocaine per site\par \par \par \par Exp 5/2023\par Manufacture: Hikma\par NDC 3243-6163-73\par [id number]\par

## 2021-12-17 NOTE — ASSESSMENT
[FreeTextEntry1] : Discussed diagnosis and treatment plan including HEP and icing area often since having back surgery next wk.  Cannot do steroid injection until after surgery. \par Can get shoulder ice pack to keep in the area easily\par massage area often after heat\par consider steroid injection after surgery \par \par She had some immediate relief and could move shoulder more. \par \par f/u 1 month

## 2021-12-29 ENCOUNTER — NON-APPOINTMENT (OUTPATIENT)
Age: 71
End: 2021-12-29

## 2022-01-25 ENCOUNTER — APPOINTMENT (OUTPATIENT)
Dept: PHYSICAL MEDICINE AND REHAB | Facility: CLINIC | Age: 72
End: 2022-01-25
Payer: MEDICARE

## 2022-01-25 DIAGNOSIS — K59.03 DRUG INDUCED CONSTIPATION: ICD-10-CM

## 2022-01-25 PROCEDURE — 20552 NJX 1/MLT TRIGGER POINT 1/2: CPT

## 2022-01-25 PROCEDURE — 99214 OFFICE O/P EST MOD 30 MIN: CPT | Mod: 25

## 2022-01-25 RX ORDER — ACETAMINOPHEN AND CODEINE 300; 30 MG/1; MG/1
300-30 TABLET ORAL EVERY 8 HOURS
Qty: 21 | Refills: 0 | Status: ACTIVE | COMMUNITY
Start: 2021-02-19 | End: 1900-01-01

## 2022-01-25 RX ORDER — METHYLNALTREXONE BROMIDE 150 MG/1
150 TABLET ORAL
Qty: 60 | Refills: 0 | Status: ACTIVE | COMMUNITY
Start: 2022-01-25 | End: 1900-01-01

## 2022-01-25 NOTE — PHYSICAL EXAM
[FreeTextEntry1] : BEN is a 71 year yr old female \par \par Constitutional: healthy appearing, NAD, and obese\par \par BACK:\par no swelling, erythema, warmth; large midline back incision C/D/I\par flexion to 30 deg, ext to 5 deg\par 5/5 bilat LE\par sensation intact in bilat LE\par walks with canes, poor balance\par TTP in bilaterally gluteus bianca

## 2022-01-25 NOTE — HISTORY OF PRESENT ILLNESS
[FreeTextEntry1] : Location: back\par Quality: sharp \par Severity: 7/10 \par Duration: few years \par Timing: recurrent \par Context: atraumatic \par Aggravating Factors: walking \par Alleviating Factors: KELLE; RF \par Associated Symptoms: no weakness; no weight loss; no fever; no chills; no change in bowel/bladder habits; numbness/tingling (tingling in legs); +radiation down leg, abd pain\par Prior Studies: MRI 2021\par 12/2013 right MBB with Dr. Laurent\par 4/2014 right RFA with Dr. Laurent - significant relief\par 1/2015 left L3, L4, L5 MBB \par 2/2015 left RFA - significant relief\par 5/2016 right RFA - significant relief\par 12/2016 right L5 and S1 KELLE - moderate relief\par 11/2018 bilateral S1 KELLE - significant relief in legs and right back\par 12/2018 left L4/5 and L5/S1 facet injection\par 12/2018 left L3, L4, L5 RFA - no more pain with oblique ext\par 1/2019 left SI joint injection\par 7/2019 left SI joint injection\par 9/2019 right SI joint injection \par 10/2020 left SI joint injection\par 10/2020 right SI joint injection\par 1/2021 left L3, L4, L5 RFA\par 2/2021 bilateral L5 KELLE\par 3/2021 right SI joint injection\par 11/2021 ganglion impars block and coccyx injection - significant relief\par 12/2021 laminectomy and laminotomy with Dr Mak Perales\par

## 2022-01-25 NOTE — ASSESSMENT
[FreeTextEntry1] : stenosis - \par oxycodone too strong for her so will give her something less strong\par ice area often\par continue massing with massage gun or ball\par educated on risk of constipation - gave sample of relistor\par f/u with Dr Mak Perales for postop and some numbness in left toes - get clearance for PT and when she can get steroids\par monitor numbness in left toes\par \par f/u 1 month

## 2022-01-25 NOTE — PROCEDURE
[de-identified] : Indication: myofascial pain\par \par After informed consent,she elected to proceed with a trigger point injection into the bilateral gluteus bianca. I confirmed no prior adverse reactions, no active infections, and no relevant allergies. \par  \par The skin was prepped in the usual sterile manner.  The sites were injected with Lidocaine followed by local needling. The injection was completed without complication and a bandage was applied. She tolerated the procedure well and was given post-injection instructions. \par  \par Cold Tx x 48 hours, analgesics prn. Medications: 0.5 ml of 1% Lidocaine per site\par \par Exp 5/2023\par Manufacture: Hikma\par NDC 4530-1581-16\par [id number]\par

## 2022-02-23 ENCOUNTER — APPOINTMENT (OUTPATIENT)
Dept: OTOLARYNGOLOGY | Facility: CLINIC | Age: 72
End: 2022-02-23
Payer: MEDICARE

## 2022-02-23 VITALS — WEIGHT: 180 LBS | HEIGHT: 61 IN | TEMPERATURE: 96 F | BODY MASS INDEX: 33.99 KG/M2

## 2022-02-23 DIAGNOSIS — J30.0 VASOMOTOR RHINITIS: ICD-10-CM

## 2022-02-23 DIAGNOSIS — K21.9 GASTRO-ESOPHAGEAL REFLUX DISEASE W/OUT ESOPHAGITIS: ICD-10-CM

## 2022-02-23 PROCEDURE — 99204 OFFICE O/P NEW MOD 45 MIN: CPT | Mod: 25

## 2022-02-23 PROCEDURE — 31231 NASAL ENDOSCOPY DX: CPT

## 2022-02-23 NOTE — ASSESSMENT
[FreeTextEntry1] : BEN REAVES appears to have vasomotor rhinitis. I will prescribe Atyrovent .03% spray

## 2022-02-23 NOTE — HISTORY OF PRESENT ILLNESS
[de-identified] : BEN REAVES is a 72 year woman with a history of nasal congestion and rhinorrhea watery and irritative rhinitis. She recently lost about 100 lbs.

## 2022-02-23 NOTE — CONSULT LETTER
[Dear  ___] : Dear  [unfilled], [Consult Letter:] : I had the pleasure of evaluating your patient, [unfilled]. [Please see my note below.] : Please see my note below. [Sincerely,] : Sincerely, [FreeTextEntry3] : Nathen Lira MD\par

## 2022-05-03 ENCOUNTER — APPOINTMENT (OUTPATIENT)
Dept: PHYSICAL MEDICINE AND REHAB | Facility: CLINIC | Age: 72
End: 2022-05-03
Payer: MEDICARE

## 2022-05-03 DIAGNOSIS — M19.011 PRIMARY OSTEOARTHRITIS, RIGHT SHOULDER: ICD-10-CM

## 2022-05-03 DIAGNOSIS — M75.41 IMPINGEMENT SYNDROME OF RIGHT SHOULDER: ICD-10-CM

## 2022-05-03 PROCEDURE — 99213 OFFICE O/P EST LOW 20 MIN: CPT | Mod: 25

## 2022-05-03 PROCEDURE — 20611 DRAIN/INJ JOINT/BURSA W/US: CPT | Mod: LT

## 2022-05-03 PROCEDURE — 73030 X-RAY EXAM OF SHOULDER: CPT | Mod: RT

## 2022-05-03 RX ORDER — DICLOFENAC SODIUM 50 MG/1
50 TABLET, DELAYED RELEASE ORAL
Qty: 60 | Refills: 0 | Status: ACTIVE | COMMUNITY
Start: 2022-01-10

## 2022-05-03 RX ORDER — ROSUVASTATIN CALCIUM 20 MG/1
20 TABLET, FILM COATED ORAL
Qty: 90 | Refills: 0 | Status: ACTIVE | COMMUNITY
Start: 2021-10-05

## 2022-05-03 RX ORDER — CLINDAMYCIN PHOSPHATE 10 MG/ML
1 LOTION TOPICAL
Qty: 60 | Refills: 0 | Status: ACTIVE | COMMUNITY
Start: 2022-04-13

## 2022-05-03 RX ORDER — LORAZEPAM 0.5 MG/1
0.5 TABLET ORAL
Qty: 4 | Refills: 0 | Status: ACTIVE | COMMUNITY
Start: 2021-11-08

## 2022-05-03 RX ORDER — SODIUM PICOSULFATE, MAGNESIUM OXIDE, AND ANHYDROUS CITRIC ACID 10; 3.5; 12 MG/160ML; G/160ML; G/160ML
10-3.5-12 MG-GM LIQUID ORAL
Qty: 320 | Refills: 0 | Status: ACTIVE | COMMUNITY
Start: 2022-03-18

## 2022-05-03 RX ORDER — DIAZEPAM 5 MG/1
5 TABLET ORAL
Qty: 2 | Refills: 0 | Status: ACTIVE | COMMUNITY
Start: 2022-04-22

## 2022-05-03 RX ORDER — SENNOSIDES 8.6 MG TABLETS 8.6 MG/1
8.6 TABLET ORAL
Qty: 56 | Refills: 0 | Status: ACTIVE | COMMUNITY
Start: 2021-12-22

## 2022-05-03 RX ORDER — FLUVOXAMINE MALEATE 25 MG/1
25 TABLET, FILM COATED ORAL
Qty: 30 | Refills: 0 | Status: ACTIVE | COMMUNITY
Start: 2022-03-17

## 2022-05-03 RX ORDER — OXYCODONE 5 MG/1
5 TABLET ORAL
Qty: 30 | Refills: 0 | Status: DISCONTINUED | COMMUNITY
Start: 2021-12-22

## 2022-05-03 RX ORDER — FINASTERIDE 5 MG/1
5 TABLET, FILM COATED ORAL
Qty: 90 | Refills: 0 | Status: ACTIVE | COMMUNITY
Start: 2021-09-09

## 2022-05-03 RX ORDER — NAPROXEN 500 MG/1
500 TABLET ORAL
Qty: 28 | Refills: 0 | Status: ACTIVE | COMMUNITY
Start: 2021-12-22

## 2022-05-03 RX ORDER — ASPIRIN 81 MG/1
81 TABLET ORAL
Qty: 28 | Refills: 0 | Status: DISCONTINUED | COMMUNITY
Start: 2021-12-22 | End: 2022-05-03

## 2022-05-03 RX ORDER — PANTOPRAZOLE 40 MG/1
40 TABLET, DELAYED RELEASE ORAL
Qty: 30 | Refills: 0 | Status: ACTIVE | COMMUNITY
Start: 2021-12-22

## 2022-05-03 NOTE — DATA REVIEWED
[FreeTextEntry1] : Office x-rays of the right shoulder AP and lateral show moderate GH arthritis inferiorly. AC joint is not well visualized. No gross fractures.

## 2022-05-03 NOTE — PHYSICAL EXAM
[FreeTextEntry1] : BEN is a 71 year yr old female \par \par Constitutional: healthy appearing, NAD, and obese\par \par BACK:\par no swelling, erythema, warmth; large midline back incision C/D/I\par flexion to 30 deg, ext to 5 deg\par 5/5 bilat LE\par sensation intact in bilat LE\par walks with rollator, poor balance\par TTP in bilaterally gluteus bianca\par \par shoulders:\par no erythema, warmth, swelling\par abd to 160 deg bilat, ER to 70 bilat, IR to 60 on right, 30 on left

## 2022-05-03 NOTE — ASSESSMENT
[FreeTextEntry1] : Discussed diagnosis and treatment plan including PT.\par Limit using rollator when she can.\par f/u for right shoulder injection\par Told to be careful with NSAIDs given age. Watch out for GI bleeding, blood in stool, and stomach irritation.\par \par lumbar - do HEP\par going to PT around her house\par consider injections if not better

## 2022-05-03 NOTE — HISTORY OF PRESENT ILLNESS
[FreeTextEntry1] : Location: back\par Quality: sharp \par Severity: moderate \par Duration: few years \par Timing: recurrent \par Context: atraumatic \par Aggravating Factors: walking \par Alleviating Factors: KELLE; RF \par Associated Symptoms: no weakness; no weight loss; no fever; no chills; no change in bowel/bladder habits; numbness/tingling (tingling in legs); +radiation down leg, abd pain\par Prior Studies: MRI 2021\par 12/2013 right MBB with Dr. Laurent\par 4/2014 right RFA with Dr. Laurent - significant relief\par 1/2015 left L3, L4, L5 MBB \par 2/2015 left RFA - significant relief\par 5/2016 right RFA - significant relief\par 12/2016 right L5 and S1 KELLE - moderate relief\par 11/2018 bilateral S1 KELLE - significant relief in legs and right back\par 12/2018 left L4/5 and L5/S1 facet injection\par 12/2018 left L3, L4, L5 RFA - no more pain with oblique ext\par 1/2019 left SI joint injection\par 7/2019 left SI joint injection\par 9/2019 right SI joint injection \par 10/2020 left SI joint injection\par 10/2020 right SI joint injection\par 1/2021 left L3, L4, L5 RFA\par 2/2021 bilateral L5 KELLE\par 3/2021 right SI joint injection\par 11/2021 ganglion impars block and coccyx injection - significant relief\par 12/2021 laminectomy and laminotomy with Dr Mak Perales\par \par Right shoulder hurting lately. 6/10. Has been using rollator a lot.  Left also hurts.  Pain with movement and shoulder ext.

## 2022-05-03 NOTE — PROCEDURE
[de-identified] : indication: pain\par \par Ultrasound Guided Left Shoulder Injection\par \par After discussion of the risks and benefits, she  elected to proceed with a corticosteroid injection into the subacromial space.\par \par Confirmed that the patient does not have history of prior adverse reactions, active infections, or relevant allergies. There was no effusion, erythema, or warmth, and the skin was clear.\par \par The skin was sterilized and then anesthetized with 1% Lidocaine. Under routine sterile technique, the site was injected with a mixture of 3 ml of 1% Lidocaine and 20 mg of Kenalog using ultrasound guidance. The injection was completed without complication and a bandage was applied.   \par \par She tolerated the procedure well and was given post-injection instructions.Rec: Cold therapy, analgesics, avoid heavy activity.\par \par \par Manufacture [employer]\par Kenalog 40 \par NDC [id number]\par Exp 2/23\par LOT [id number]\par \par Lidocaine\par H[employer]\par [id number]\par Lot [id number]\par Exp 5/1/23\par \par

## 2022-05-10 ENCOUNTER — APPOINTMENT (OUTPATIENT)
Dept: PHYSICAL MEDICINE AND REHAB | Facility: CLINIC | Age: 72
End: 2022-05-10

## 2022-06-21 ENCOUNTER — APPOINTMENT (OUTPATIENT)
Dept: PHYSICAL MEDICINE AND REHAB | Facility: CLINIC | Age: 72
End: 2022-06-21
Payer: MEDICARE

## 2022-06-21 PROCEDURE — 99214 OFFICE O/P EST MOD 30 MIN: CPT | Mod: 25

## 2022-06-21 PROCEDURE — 20552 NJX 1/MLT TRIGGER POINT 1/2: CPT

## 2022-06-21 RX ORDER — FLUOXETINE HYDROCHLORIDE 10 MG/1
10 CAPSULE ORAL
Qty: 30 | Refills: 0 | Status: ACTIVE | COMMUNITY
Start: 2022-06-03

## 2022-06-21 NOTE — HISTORY OF PRESENT ILLNESS
[FreeTextEntry1] : Location: back\par Quality: sharp \par Severity: severe; worse lately\par Duration: few years \par Timing: recurrent \par Context: atraumatic \par Aggravating Factors: walking \par Alleviating Factors: KELLE; RF \par Associated Symptoms: no weakness; no weight loss; no fever; no chills; no change in bowel/bladder habits; numbness/tingling (tingling in legs); +radiation down left leg\par Prior Studies: MRI 2021\par 12/2013 right MBB with Dr. Laurent\par 4/2014 right RFA with Dr. Laurent - significant relief\par 1/2015 left L3, L4, L5 MBB \par 2/2015 left RFA - significant relief\par 5/2016 right RFA - significant relief\par 12/2016 right L5 and S1 KELLE - moderate relief\par 11/2018 bilateral S1 KELLE - significant relief in legs and right back\par 12/2018 left L4/5 and L5/S1 facet injection\par 12/2018 left L3, L4, L5 RFA - no more pain with oblique ext\par 1/2019 left SI joint injection\par 7/2019 left SI joint injection\par 9/2019 right SI joint injection \par 10/2020 left SI joint injection\par 10/2020 right SI joint injection\par 1/2021 left L3, L4, L5 RFA\par 2/2021 bilateral L5 KELLE\par 3/2021 right SI joint injection\par 11/2021 ganglion impars block and coccyx injection - significant relief\par 12/2021 laminectomy and laminotomy with Dr Mak Perales\par \par

## 2022-06-21 NOTE — PROCEDURE
[de-identified] : Indication: myofascial pain\par \par After informed consent,she elected to proceed with a trigger point injection into the left gluteus bianca. I confirmed no prior adverse reactions, no active infections, and no relevant allergies. \par  \par The skin was prepped in the usual sterile manner.  The sites were injected with Lidocaine followed by local needling. The injection was completed without complication and a bandage was applied. She tolerated the procedure well and was given post-injection instructions. \par  \par Cold Tx x 48 hours, analgesics prn. Medications: 0.5 ml of 1% Lidocaine per site\par \par \par \par Exp 5/2023\par Manufacture: Hikma\par NDC 2408-0864-88\par [id number]\par

## 2022-06-21 NOTE — PHYSICAL EXAM
[FreeTextEntry1] : BEN is a 72 year yr old female \par \par Constitutional: healthy appearing, NAD, and obese\par \par BACK:\par no swelling, erythema, warmth; large midline back incision C/D/I\par flexion to 30 deg, ext to 5 deg\par 5/5 bilat LE\par sensation intact in bilat LE\par walks with rollator, poor balance\par TTP in left gluteus bianca\par \par

## 2022-06-21 NOTE — ASSESSMENT
[FreeTextEntry1] : MRI report not back but images shows L4/5 and L5/S1 NF stenosis severe on right and moderate on left, L4/5 and L5/S1 HNP.\par \par Discussed diagnosis and treatment plan including PT.\par Discussed KELLE given severe pain. She elects to do it.  Stop nsaids  2 days before.\par Do more bridges.\par Developing neuropathy in feet which could be from diabetes.  Consider EMG in a few wks. \par Sleep with knee bent when supine. \par Does not want stronger meds.

## 2022-07-05 ENCOUNTER — APPOINTMENT (OUTPATIENT)
Dept: PHYSICAL MEDICINE AND REHAB | Facility: CLINIC | Age: 72
End: 2022-07-05

## 2022-07-05 PROCEDURE — 20552 NJX 1/MLT TRIGGER POINT 1/2: CPT

## 2022-07-05 PROCEDURE — 99213 OFFICE O/P EST LOW 20 MIN: CPT | Mod: 25

## 2022-07-05 RX ORDER — COVID-19 ANTIGEN TEST
KIT MISCELLANEOUS
Qty: 6 | Refills: 0 | Status: ACTIVE | COMMUNITY
Start: 2022-06-27

## 2022-07-05 RX ORDER — HALOBETASOL PROPIONATE 0.5 MG/G
0.05 CREAM TOPICAL
Qty: 100 | Refills: 0 | Status: ACTIVE | COMMUNITY
Start: 2022-06-30

## 2022-07-06 NOTE — ASSESSMENT
[FreeTextEntry1] : disc - Discussed diagnosis and treatment plan including PT.\par Try nuun tablets for cramps\par Reviewed stretches to do.  Warm up and ease into the stretch gently. \par Educated PF standing is a strengthening exercise; not a stretching one\par f/u with Dr Perales' PA for CT results\par \par stenosis - pain is better so postponed KELLE\par \par SI - discussed repeat injection and she elects to do it\par \par

## 2022-07-06 NOTE — HISTORY OF PRESENT ILLNESS
[FreeTextEntry1] : Location: back\par Quality: sharp \par Severity: 3/10\par Duration: few years \par Timing: recurrent \par Context: atraumatic \par Aggravating Factors: walking \par Alleviating Factors: KELLE; RF \par Associated Symptoms: no weakness; no weight loss; no fever; no chills; no change in bowel/bladder habits; numbness/tingling (tingling in legs)\par Prior Studies: MRI 2021\par 12/2013 right MBB with Dr. Laurent\par 4/2014 right RFA with Dr. Laurent - significant relief\par 1/2015 left L3, L4, L5 MBB \par 2/2015 left RFA - significant relief\par 5/2016 right RFA - significant relief\par 12/2016 right L5 and S1 KELLE - moderate relief\par 11/2018 bilateral S1 KELLE - significant relief in legs and right back\par 12/2018 left L4/5 and L5/S1 facet injection\par 12/2018 left L3, L4, L5 RFA - no more pain with oblique ext\par 1/2019 left SI joint injection\par 7/2019 left SI joint injection\par 9/2019 right SI joint injection \par 10/2020 left SI joint injection\par 10/2020 right SI joint injection\par 1/2021 left L3, L4, L5 RFA\par 2/2021 bilateral L5 KELLE\par 3/2021 right SI joint injection\par 11/2021 ganglion impars block and coccyx injection - significant relief\par 12/2021 laminectomy and laminotomy with Dr Mak Perales\par \par

## 2022-07-06 NOTE — PHYSICAL EXAM
[FreeTextEntry1] : BEN is a 72 year yr old female \par \par Constitutional: healthy appearing, NAD, and obese\par \par BACK:\par no swelling, erythema, warmth; large midline back incision C/D/I\par flexion to 30 deg, ext to 5 deg\par 5/5 bilat LE\par sensation intact in bilat LE\par walks with rollator, poor balance\par TTP in right gluteus bianca\par TTP in right SI joint

## 2022-07-06 NOTE — PROCEDURE
[de-identified] : Indication: myofascial pain\par \par After informed consent,she elected to proceed with a trigger point injection into the right gluteus bianca. I confirmed no prior adverse reactions, no active infections, and no relevant allergies. \par  \par The skin was prepped in the usual sterile manner.  The sites were injected with Lidocaine followed by local needling. The injection was completed without complication and a bandage was applied. She tolerated the procedure well and was given post-injection instructions. \par  \par Cold Tx x 48 hours, analgesics prn. Medications: 0.5 ml of 1% Lidocaine per site\par \par \par \par Exp 5/2023\par Manufacture: [employer]\par NDC 8099-9057-63\par [id number]\par

## 2022-08-04 ENCOUNTER — APPOINTMENT (OUTPATIENT)
Dept: PHYSICAL MEDICINE AND REHAB | Facility: CLINIC | Age: 72
End: 2022-08-04

## 2022-08-04 PROCEDURE — 99214 OFFICE O/P EST MOD 30 MIN: CPT | Mod: 25

## 2022-08-04 PROCEDURE — 20552 NJX 1/MLT TRIGGER POINT 1/2: CPT

## 2022-08-04 RX ORDER — FLUCONAZOLE 200 MG/1
200 TABLET ORAL
Qty: 4 | Refills: 0 | Status: ACTIVE | COMMUNITY
Start: 2022-07-27

## 2022-08-04 RX ORDER — NYSTATIN 100000 [USP'U]/G
100000 CREAM TOPICAL
Qty: 60 | Refills: 0 | Status: ACTIVE | COMMUNITY
Start: 2022-07-27

## 2022-08-04 RX ORDER — HYDROCORTISONE 25 MG/G
2.5 CREAM TOPICAL
Qty: 30 | Refills: 0 | Status: ACTIVE | COMMUNITY
Start: 2022-07-27

## 2022-08-04 NOTE — ASSESSMENT
[FreeTextEntry1] : Discussed diagnosis and treatment plan including PT.\par Told to be careful with NSAIDs given age. Watch out for GI bleeding, blood in stool, and stomach irritation.\par Only take 1 nsaid.  \par Ice area often.\par Use coccyx cushion.\par \par Getting MRI cervical with Dr Perales.\par \par

## 2022-08-04 NOTE — PHYSICAL EXAM
[FreeTextEntry1] : BEN is a 72 year yr old female \par \par Constitutional: healthy appearing, NAD, and obese\par \par BACK:\par no swelling, erythema, warmth; large midline back incision C/D/I\par flexion to 30 deg, ext to 5 deg\par 5/5 bilat LE\par sensation intact in bilat LE\par poor balance\par TTP in gluteus bianca\par TTP in sacrum\par \par antalgic gait with canes

## 2022-08-04 NOTE — PROCEDURE
[de-identified] : Indication: myofascial pain\par \par After informed consent,she elected to proceed with a trigger point injection into the bilateral gluteus bianca. I confirmed no prior adverse reactions, no active infections, and no relevant allergies. \par  \par The skin was prepped in the usual sterile manner.  The sites were injected with Lidocaine followed by local needling. The injection was completed without complication and a bandage was applied. She tolerated the procedure well and was given post-injection instructions. \par  \par Cold Tx x 48 hours, analgesics prn. Medications: 0.5 ml of 1% Lidocaine per site\par \par Exp 5/2023\par Manufacture: [employer]\par NDC 3297-9989-79\par [id number]\par

## 2022-08-04 NOTE — HISTORY OF PRESENT ILLNESS
[FreeTextEntry1] : Location: back\par Quality: sharp \par Severity: severe last few wks\par Duration: few years \par Timing: recurrent \par Context: atraumatic \par Aggravating Factors: walking \par Alleviating Factors: KELLE; RF \par Associated Symptoms: no weakness; no weight loss; no fever; no chills; no change in bowel/bladder habits; numbness/tingling (tingling in legs)\par Prior Studies: MRI 2021\par 12/2013 right MBB with Dr. Laurent\par 4/2014 right RFA with Dr. Laurent - significant relief\par 1/2015 left L3, L4, L5 MBB \par 2/2015 left RFA - significant relief\par 5/2016 right RFA - significant relief\par 12/2016 right L5 and S1 KELLE - moderate relief\par 11/2018 bilateral S1 KELLE - significant relief in legs and right back\par 12/2018 left L4/5 and L5/S1 facet injection\par 12/2018 left L3, L4, L5 RFA - no more pain with oblique ext\par 1/2019 left SI joint injection\par 7/2019 left SI joint injection\par 9/2019 right SI joint injection \par 10/2020 left SI joint injection\par 10/2020 right SI joint injection\par 1/2021 left L3, L4, L5 RFA\par 2/2021 bilateral L5 KELLE\par 3/2021 right SI joint injection\par 11/2021 ganglion impars block and coccyx injection - significant relief\par 12/2021 laminectomy and laminotomy with Dr Mak Perales\par \par

## 2022-08-09 ENCOUNTER — OUTPATIENT (OUTPATIENT)
Dept: OUTPATIENT SERVICES | Facility: HOSPITAL | Age: 72
LOS: 1 days | End: 2022-08-09

## 2022-08-09 ENCOUNTER — APPOINTMENT (OUTPATIENT)
Dept: OPHTHALMOLOGY | Facility: CLINIC | Age: 72
End: 2022-08-09

## 2022-08-10 DIAGNOSIS — H40.009 PREGLAUCOMA, UNSPECIFIED, UNSPECIFIED EYE: ICD-10-CM

## 2022-08-17 ENCOUNTER — APPOINTMENT (OUTPATIENT)
Dept: OTOLARYNGOLOGY | Facility: CLINIC | Age: 72
End: 2022-08-17

## 2022-08-17 VITALS — TEMPERATURE: 98 F | BODY MASS INDEX: 33.99 KG/M2 | WEIGHT: 180 LBS | HEIGHT: 61 IN

## 2022-08-17 DIAGNOSIS — J34.89 OTHER SPECIFIED DISORDERS OF NOSE AND NASAL SINUSES: ICD-10-CM

## 2022-08-17 DIAGNOSIS — J31.0 CHRONIC RHINITIS: ICD-10-CM

## 2022-08-17 PROCEDURE — 31231 NASAL ENDOSCOPY DX: CPT

## 2022-08-17 PROCEDURE — 99214 OFFICE O/P EST MOD 30 MIN: CPT | Mod: 25

## 2022-08-17 NOTE — ASSESSMENT
[FreeTextEntry1] : BEN REAVES has months of intermittent retro orbital pain. I will call with culture. She may need imaging.

## 2022-08-17 NOTE — HISTORY OF PRESENT ILLNESS
[de-identified] : BEN REAVES is a 72 year woman with a history of VMR. She has intermittent post orbital pain for hours at a time.Her nose is clear. She gets some reflux.

## 2022-08-24 LAB — EAR NOSE AND THROAT CULTURE: NORMAL

## 2022-08-29 ENCOUNTER — NON-APPOINTMENT (OUTPATIENT)
Age: 72
End: 2022-08-29

## 2022-08-30 ENCOUNTER — OUTPATIENT (OUTPATIENT)
Dept: OUTPATIENT SERVICES | Facility: HOSPITAL | Age: 72
LOS: 1 days | End: 2022-08-30
Payer: MEDICARE

## 2022-08-30 ENCOUNTER — APPOINTMENT (OUTPATIENT)
Dept: CT IMAGING | Facility: HOSPITAL | Age: 72
End: 2022-08-30

## 2022-08-30 PROCEDURE — 70486 CT MAXILLOFACIAL W/O DYE: CPT

## 2022-08-30 PROCEDURE — 70486 CT MAXILLOFACIAL W/O DYE: CPT | Mod: 26,MH

## 2022-09-01 ENCOUNTER — APPOINTMENT (OUTPATIENT)
Dept: PHYSICAL MEDICINE AND REHAB | Facility: CLINIC | Age: 72
End: 2022-09-01

## 2022-09-01 PROCEDURE — 20611 DRAIN/INJ JOINT/BURSA W/US: CPT | Mod: LT

## 2022-09-01 PROCEDURE — 99214 OFFICE O/P EST MOD 30 MIN: CPT | Mod: 25

## 2022-09-01 NOTE — PROCEDURE
[de-identified] : indication: pain\par \par Ultrasound Guided Left Shoulder Injection\par \par After discussion of the risks and benefits, she  elected to proceed with a corticosteroid injection into the subacromial space.\par \par Confirmed that the patient does not have history of prior adverse reactions, active infections, or relevant allergies. There was no effusion, erythema, or warmth, and the skin was clear.\par \par The skin was sterilized and then anesthetized with 2% Lidocaine. Under routine sterile technique, the site was injected with a mixture of 1.5 ml of 2% Lidocaine and 20 mg of Kenalog using ultrasound guidance. The injection was completed without complication and a bandage was applied.   \par \par She tolerated the procedure well and was given post-injection instructions.Rec: Cold therapy, analgesics, avoid heavy activity.\par \par Manufacture [employer]\par Kenalog 40 \par NDC [id number]\par Exp 2/23\par LOT [id number]\par \par Lidocaine\par [employer]\par [id number]\par 3/2025\par NDC 89782-580-33

## 2022-09-01 NOTE — ASSESSMENT
[FreeTextEntry1] : coccyx - ice area properly\par avoid sitting on hard surfaces\par \par lumbar - do HEP\par \par shoulder - calcific tendinitis seen on u/s\par do not do overhead activity for 3-4 wk unless in PT.  She tends to push herself too hard in exercise class.\par She  denies being sick or having f/c, SOB, cough.  She  understands risk of immunosuppression from steroids.  Do not get vaccines for 2 wk.\par Told to be careful with NSAIDs given age. Watch out for GI bleeding, blood in stool, and stomach irritation.\par \par \par f/u 1 month

## 2022-09-01 NOTE — PHYSICAL EXAM
[FreeTextEntry1] : BEN is a 72 year yr old female \par \par Constitutional: healthy appearing, NAD, and obese\par \par BACK:\par no swelling, erythema, warmth\par flexion to 30 deg, ext to 5 deg\par 5/5 bilat LE\par sensation intact in bilat LE\par walks with cane, poor balance\par TTP in bilaterally gluteus bianca\par \par shoulders:\par no erythema, warmth, swelling\par abd to 160 deg bilat, ER to 70 bilat, IR to 60 on right, 30 on left. \par +empty can\par 5/5 LUE

## 2022-09-01 NOTE — HISTORY OF PRESENT ILLNESS
[FreeTextEntry1] : Location: back and coccyx\par Quality: sharp \par Severity: 3/10\par Duration: few years \par Timing: recurrent \par Context: atraumatic \par Aggravating Factors: walking \par Alleviating Factors: KELLE; RF \par Associated Symptoms: no weakness; no weight loss; no fever; no chills; no change in bowel/bladder habits; numbness/tingling (tingling in legs)\par Prior Studies: MRI 2021\par 12/2013 right MBB with Dr. Laurent\par 4/2014 right RFA with Dr. Laurent - significant relief\par 1/2015 left L3, L4, L5 MBB \par 2/2015 left RFA - significant relief\par 5/2016 right RFA - significant relief\par 12/2016 right L5 and S1 KELLE - moderate relief\par 11/2018 bilateral S1 KELLE - significant relief in legs and right back\par 12/2018 left L4/5 and L5/S1 facet injection\par 12/2018 left L3, L4, L5 RFA - no more pain with oblique ext\par 1/2019 left SI joint injection\par 7/2019 left SI joint injection\par 9/2019 right SI joint injection \par 10/2020 left SI joint injection\par 10/2020 right SI joint injection\par 1/2021 left L3, L4, L5 RFA\par 2/2021 bilateral L5 KELLE\par 3/2021 right SI joint injection\par 11/2021 ganglion impars block and coccyx injection - significant relief\par 12/2021 laminectomy and laminotomy with Dr Mak Perales\par \par Left shoulder hurting again.  Pain after using 4 lb for shoulder presses.  \par 5/2022 left subacromial injection

## 2022-10-04 ENCOUNTER — APPOINTMENT (OUTPATIENT)
Dept: PHYSICAL MEDICINE AND REHAB | Facility: CLINIC | Age: 72
End: 2022-10-04

## 2022-10-04 PROCEDURE — 99214 OFFICE O/P EST MOD 30 MIN: CPT

## 2022-10-04 RX ORDER — FLUVOXAMINE MALEATE 50 MG/1
50 TABLET ORAL
Refills: 0 | Status: DISCONTINUED | COMMUNITY
End: 2022-10-04

## 2022-10-04 RX ORDER — MELOXICAM 7.5 MG/1
7.5 TABLET ORAL TWICE DAILY
Qty: 14 | Refills: 0 | Status: ACTIVE | COMMUNITY
Start: 2022-08-04 | End: 1900-01-01

## 2022-10-04 NOTE — ASSESSMENT
[FreeTextEntry1] : shoulder - calcific tendinitis seen on u/s\par She tends to push herself too hard in exercise class.\par Told to be careful with NSAIDs given age. Watch out for GI bleeding, blood in stool, and stomach irritation.\par \par sacrum - limit sitting\par work on more bridges\par corrected hip abd\par \par f/u 1 month

## 2022-10-04 NOTE — PHYSICAL EXAM
[FreeTextEntry1] : BEN is a 72 year yr old female \par \par Constitutional: healthy appearing, NAD, and obese\par \par BACK:\par no swelling, erythema, warmth\par flexion to 30 deg, ext to 5 deg\par 5/5 bilat LE\par sensation intact in bilat LE\par walks with cane, poor balance\par TTP in bilaterally gluteus bianca\par \par shoulders:\par no erythema, warmth, swelling\par abd to 135 deg bilat, ER to 80 bilat, IR to 60 on right, 30 on left. \par +empty can\par 5/5 LUE

## 2022-10-04 NOTE — HISTORY OF PRESENT ILLNESS
[FreeTextEntry1] : Location: back and coccyx\par Quality: sharp \par Severity: 6/10\par Duration: few years \par Timing: recurrent \par Context: atraumatic \par Aggravating Factors: walking, sitting\par Alleviating Factors: KELLE; RF \par Associated Symptoms: no weakness; no weight loss; no fever; no chills; no change in bowel/bladder habits; numbness/tingling (tingling in legs)\par Prior Studies: MRI 2021\par 12/2013 right MBB with Dr. Laurent\par 4/2014 right RFA with Dr. Laurent - significant relief\par 1/2015 left L3, L4, L5 MBB \par 2/2015 left RFA - significant relief\par 5/2016 right RFA - significant relief\par 12/2016 right L5 and S1 KELLE - moderate relief\par 11/2018 bilateral S1 KELLE - significant relief in legs and right back\par 12/2018 left L4/5 and L5/S1 facet injection\par 12/2018 left L3, L4, L5 RFA - no more pain with oblique ext\par 1/2019 left SI joint injection\par 7/2019 left SI joint injection\par 9/2019 right SI joint injection \par 10/2020 left SI joint injection\par 10/2020 right SI joint injection\par 1/2021 left L3, L4, L5 RFA\par 2/2021 bilateral L5 KELLE\par 3/2021 right SI joint injection\par 11/2021 ganglion impars block and coccyx injection - significant relief\par 12/2021 laminectomy and laminotomy with Dr Mak Perales\par \par Left shoulder hurting again. Pain after using 4 lb for shoulder presses. \par 5/2022 left subacromial injection \par 9/2022 left subacromial injection

## 2022-10-10 ENCOUNTER — RX RENEWAL (OUTPATIENT)
Age: 72
End: 2022-10-10

## 2022-11-01 ENCOUNTER — APPOINTMENT (OUTPATIENT)
Dept: OTOLARYNGOLOGY | Facility: CLINIC | Age: 72
End: 2022-11-01

## 2022-11-01 VITALS
WEIGHT: 185 LBS | HEART RATE: 67 BPM | HEIGHT: 61 IN | DIASTOLIC BLOOD PRESSURE: 67 MMHG | SYSTOLIC BLOOD PRESSURE: 127 MMHG | TEMPERATURE: 98 F | OXYGEN SATURATION: 96 % | BODY MASS INDEX: 34.93 KG/M2

## 2022-11-01 DIAGNOSIS — H90.3 SENSORINEURAL HEARING LOSS, BILATERAL: ICD-10-CM

## 2022-11-01 DIAGNOSIS — M54.2 CERVICALGIA: ICD-10-CM

## 2022-11-01 PROCEDURE — 99213 OFFICE O/P EST LOW 20 MIN: CPT

## 2022-11-01 PROCEDURE — 92557 COMPREHENSIVE HEARING TEST: CPT

## 2022-11-01 PROCEDURE — 92550 TYMPANOMETRY & REFLEX THRESH: CPT | Mod: 52

## 2022-11-01 NOTE — HISTORY OF PRESENT ILLNESS
[de-identified] : 73 y/o F presenting with hearing loss for the past three years. Insidious onset. She has trouble in loud, noisy restaurants. She has had a  in the past (no results available) and was told she has hearing loss. She saw an audiologist who recommended "IQ buds." amplifiers. She has tried them with no relief. Nonsmoker. No FH or SH pertinent to cc. She c/o neck pain and sees a chiropractor for this. Patient has seen a neurologist for this in the past.

## 2022-11-01 NOTE — ASSESSMENT
[FreeTextEntry1] : b hf snhl\par - showed b hf snhl -results reviewed with pt \par -recommended hae- pt wishes to think about this, given her information \par -for neck pain recommend she follows up with neurologist and not allow chiropractic manipulation of her neck\par RTC in 6 months to recheck hearing or sooner as needed

## 2022-11-07 ENCOUNTER — TRANSCRIPTION ENCOUNTER (OUTPATIENT)
Age: 72
End: 2022-11-07

## 2022-11-08 ENCOUNTER — APPOINTMENT (OUTPATIENT)
Dept: PHYSICAL MEDICINE AND REHAB | Facility: CLINIC | Age: 72
End: 2022-11-08

## 2022-11-08 PROCEDURE — 99213 OFFICE O/P EST LOW 20 MIN: CPT

## 2022-11-08 RX ORDER — IPRATROPIUM BROMIDE 21 UG/1
0.03 SPRAY NASAL
Qty: 1 | Refills: 3 | Status: ACTIVE | COMMUNITY
Start: 2022-11-08 | End: 1900-01-01

## 2022-11-08 NOTE — ASSESSMENT
[FreeTextEntry1] : shoulder - calcific tendinitis seen on u/s\par She tends to push herself too hard in exercise class.\par Told to be careful with NSAIDs given age. Watch out for GI bleeding, blood in stool, and stomach irritation.\par MRI would not  at this point.  She understands my rationale. Educated it will take months to heal. \par Taught chair dips and shd abd\par \par stenosis - still has NF stenosis since only had posterior decompression\par limit sitting; get better cushion\par work on more bridges\par reminded how to do hip abd\par taught hamstring stretch; stretch daily before bed as well\par discussed right KELLE for leg pain and cramps\par \par f/u 1 month

## 2022-11-08 NOTE — HISTORY OF PRESENT ILLNESS
[FreeTextEntry1] : Location: back and coccyx\par Quality: sharp \par Severity: 6/10\par Duration: few years \par Timing: recurrent \par Context: atraumatic \par Aggravating Factors: walking, sitting\par Alleviating Factors: KELLE; RF \par Associated Symptoms: no weakness; no weight loss; no fever; no chills; no change in bowel/bladder habits; numbness/tingling (tingling in legs)\par Prior Studies: MRI 2021\par 12/2018 left L3, L4, L5 RFA - no more pain with oblique ext\par 1/2019 left SI joint injection\par 7/2019 left SI joint injection\par 9/2019 right SI joint injection \par 10/2020 left SI joint injection\par 10/2020 right SI joint injection\par 1/2021 left L3, L4, L5 RFA\par 2/2021 bilateral L5 KELLE\par 3/2021 right SI joint injection\par 11/2021 ganglion impars block and coccyx injection - significant relief\par 12/2021 laminectomy and laminotomy with Dr Mak Perales\par \par Left shoulder hurting still.  Pain after using 4 lb for shoulder presses.  Still doing PT. \par 5/2022 left subacromial injection \par 9/2022 left subacromial injection \par

## 2022-11-08 NOTE — PHYSICAL EXAM
[FreeTextEntry1] : BEN is a 72 year yr old female \par \par Constitutional: healthy appearing, NAD, and obese\par \par BACK:\par no swelling, erythema, warmth\par flexion to 30 deg, ext to 5 deg\par 5/5 bilat LE\par sensation intact in bilat LE\par walks with cane, poor balance\par TTP in right gluteus bianca medial to ischial tuberosity\par \par shoulders:\par no erythema, warmth, swelling\par abd to 135 deg bilat, ER to 80 bilat, IR to 60 on right, 30 on left. \par +empty can\par 5/5 LUE

## 2022-11-09 ENCOUNTER — RX RENEWAL (OUTPATIENT)
Age: 72
End: 2022-11-09

## 2022-11-30 ENCOUNTER — TRANSCRIPTION ENCOUNTER (OUTPATIENT)
Age: 72
End: 2022-11-30

## 2022-12-06 ENCOUNTER — APPOINTMENT (OUTPATIENT)
Dept: PHYSICAL MEDICINE AND REHAB | Facility: CLINIC | Age: 72
End: 2022-12-06

## 2022-12-06 DIAGNOSIS — M48.061 SPINAL STENOSIS, LUMBAR REGION WITHOUT NEUROGENIC CLAUDICATION: ICD-10-CM

## 2022-12-06 PROCEDURE — 99213 OFFICE O/P EST LOW 20 MIN: CPT

## 2022-12-06 NOTE — PHYSICAL EXAM
[FreeTextEntry1] : BEN is a 72 year yr old female \par \par Constitutional: healthy appearing, NAD, and obese\par \par BACK:\par no swelling, erythema, warmth\par flexion to 30 deg, ext to 5 deg\par 5/5 bilat LE\par sensation intact in bilat LE\par walks with cane, poor balance\par TTP in right gluteus bianca superior to ischial tuberosity\par \par left shoulder:\par no erythema, warmth, swelling\par abd to 150 deg bilat, ER to 80 bilat, IR to 60 on right\par +empty can\par 5/5 LUE

## 2022-12-06 NOTE — HISTORY OF PRESENT ILLNESS
[FreeTextEntry1] : Location: back and coccyx\par Quality: sharp \par Severity: 4/10, she reports her physical therapist said she may have hamstring tendinopathy\par Duration: few years \par Timing: recurrent \par Context: atraumatic \par Aggravating Factors: walking, sitting\par Alleviating Factors: KELLE; RF \par Associated Symptoms: no weakness; no weight loss; no fever; no chills; no change in bowel/bladder habits; numbness/tingling (tingling in legs)\par Prior Studies: MRI 2021\par 12/2018 left L3, L4, L5 RFA - no more pain with oblique ext\par 1/2019 left SI joint injection\par 7/2019 left SI joint injection\par 9/2019 right SI joint injection \par 10/2020 left SI joint injection\par 10/2020 right SI joint injection\par 1/2021 left L3, L4, L5 RFA\par 2/2021 bilateral L5 KELLE\par 3/2021 right SI joint injection\par 11/2021 ganglion impars block and coccyx injection - significant relief\par 12/2021 laminectomy and laminotomy with Dr Mak Perales\par \par Left shoulder hurting still. Pain after using 4 lb for shoulder presses. Still doing PT. \par 5/2022 left subacromial injection \par 9/2022 left subacromial injection \par \par

## 2022-12-06 NOTE — ASSESSMENT
[FreeTextEntry1] : shoulder - calcific tendinitis seen on u/s.  ROM improved. \par She tends to push herself too hard in exercise class.\par Told to be careful with NSAIDs given age. Watch out for GI bleeding, blood in stool, and stomach irritation.\par continue chair dips and shd abd\par Keep a diary on when it hurts her.  \par \par stenosis - still has NF stenosis since only had posterior decompression\par no signs of hamstring tendinopathy.  Offered TPI but she does not want it yet.  Massage it out with a tennis ball. \par limit sitting\par work on more bridges\par do hip abd\par discussed right KELLE for leg pain and cramps\par Getting mbb at [facility].  Let me know the result.  Educated not to take pain meds that day.  \par \par f/u 1 month

## 2022-12-07 ENCOUNTER — APPOINTMENT (OUTPATIENT)
Dept: PHARMACY | Facility: CLINIC | Age: 72
End: 2022-12-07

## 2022-12-07 PROCEDURE — V5090: CPT

## 2022-12-07 PROCEDURE — V5010 ASSESSMENT FOR HEARING AID: CPT

## 2022-12-16 ENCOUNTER — APPOINTMENT (OUTPATIENT)
Dept: PHARMACY | Facility: CLINIC | Age: 72
End: 2022-12-16

## 2022-12-16 PROCEDURE — V5299A: CUSTOM | Mod: NC

## 2022-12-30 ENCOUNTER — APPOINTMENT (OUTPATIENT)
Dept: PHARMACY | Facility: CLINIC | Age: 72
End: 2022-12-30
Payer: SELF-PAY

## 2022-12-30 PROCEDURE — V5299A: CUSTOM | Mod: NC

## 2023-01-10 ENCOUNTER — APPOINTMENT (OUTPATIENT)
Dept: PHYSICAL MEDICINE AND REHAB | Facility: CLINIC | Age: 73
End: 2023-01-10

## 2023-02-28 ENCOUNTER — APPOINTMENT (OUTPATIENT)
Dept: ORTHOPEDIC SURGERY | Facility: CLINIC | Age: 73
End: 2023-02-28
Payer: MEDICARE

## 2023-02-28 VITALS — WEIGHT: 170 LBS | BODY MASS INDEX: 32.1 KG/M2 | HEIGHT: 61 IN

## 2023-02-28 PROCEDURE — 99213 OFFICE O/P EST LOW 20 MIN: CPT | Mod: 25

## 2023-02-28 PROCEDURE — 73030 X-RAY EXAM OF SHOULDER: CPT | Mod: LT

## 2023-02-28 PROCEDURE — 20610 DRAIN/INJ JOINT/BURSA W/O US: CPT | Mod: LT

## 2023-02-28 NOTE — PHYSICAL EXAM
[de-identified] : Left Shoulder:\par Constitutional:\par The patient is healthy-appearing and in no apparent distress. \par \par Cardiovascular System: \par The capillary refill is less than 2 seconds. \par \par Skin: \par There are no skin abnormalities.\par \par C-Spine/Neck:\par \par Active Range of Motion:\par Flexion				50\par Extension			60\par Lateral rotation			80  \par \par Left Shoulder: \par Inspection: \par There is no atrophy, erythema, warmth, swelling.\par There is no scapular winging.\par There is no AC prominence. \par \par Bony Palpation: \par There is no tenderness of the clavicle.\par There is no tenderness of the acromioclavicular joint.\par There is no tenderness of the greater tuberosity. \par There is no tenderness of the bicipital groove.\par  \par Soft Tissue Palpation: \par There is no tenderness of the trapezius.\par There is no tenderness of the rhomboid.\par There is no tenderness of the subacromial bursa. \par \par Active Range of Motion: \par Forward flexion- 				180 \par Abduction-					150\par External rotation at 0 degrees abduction-	80 \par Internal rotation at 0 degrees abduction-	80\par \par Passive Range of Motion: \par Forward flexion- 			180 \par Abduction-				150\par External rotation at 0 deg abduction-	80 \par Internal rotation at 0 deg abduction-	80\par \par Strength:\par Supraspinatus / Abduction                  5/5\par External rotation                                 5/5\par Internal rotation                                   5/5\par \par Special Tests: \par Hawkin's  				Positive \par Neer's  				Positive \par Speed's  				Negative\par AC cross-over 			            Negative\par Gaylord's  				Negative\par \par Neurological System: \par \par There is normal sensation to light touch C5-T1. \par \par Stability: \par There is no general laxity. \par \par Psychiatric: \par The patient demonstrates a normal mood and affect and is active and alert [de-identified] : Given patient's reported history and physical examination, x-ray evaluation ( as listed below ) was ordered and performed to aid in diagnosis and treatment of the patient.\par X-ray left shoulder.  There is mild and inferior humeral spurring consistent with mild running arthritis and mild subacromial calcific tendinitis

## 2023-02-28 NOTE — HISTORY OF PRESENT ILLNESS
[de-identified] : location: left shoulder\par duration: 3 months\par context: known rotator cuff problems; atraumatic\par quality: "feels like arm is being pulled out of socket"; sharp pain\par aggravating factors: arm abduction\par associated sx: N/A\par conservative tx: PT\par prior studies: N/A

## 2023-02-28 NOTE — ASSESSMENT
[FreeTextEntry1] : Discussed with patient exam history and imaging and treatment options at this time patient lacks cortisone injection physical therapy and home exercises.  If no significant improvement consideration and MRI of shoulder with understanding she may ultimately require subacromial decompression bursectomy and acromioplasty blood given the underlying arthritis she may require total shoulder replacement if these measures failed to provide relief\par

## 2023-02-28 NOTE — PROCEDURE
[de-identified] : Patient has demonstrated limited relief from NSAIDS, rest, exercises / PT, and after discussion of the risks and benefits, the patient has elected to proceed with a corticosteroid injection into the LEFT shoulder via Posterolateral site.\par Confirmed that the patient does not have history of prior adverse reactions, active, infections, or relevant allergies.   There was no erythema or warmth, and the skin was clear.  The skin was sterilized with alcohol and via sterile technique, the shoulder was injected with 3 cc of 1% xylocaine and 40 mg of Kenalog.  The injection was completed without complication and a bandage was applied.  The patient tolerated the procedure well and was given post-injection instructions.

## 2023-04-14 ENCOUNTER — APPOINTMENT (OUTPATIENT)
Dept: PHYSICAL MEDICINE AND REHAB | Facility: CLINIC | Age: 73
End: 2023-04-14
Payer: MEDICARE

## 2023-04-14 DIAGNOSIS — M53.3 SACROCOCCYGEAL DISORDERS, NOT ELSEWHERE CLASSIFIED: ICD-10-CM

## 2023-04-14 PROCEDURE — 20552 NJX 1/MLT TRIGGER POINT 1/2: CPT

## 2023-04-14 PROCEDURE — 99213 OFFICE O/P EST LOW 20 MIN: CPT | Mod: 25

## 2023-04-14 NOTE — PHYSICAL EXAM
[FreeTextEntry1] : BEN is a 73 year old female \par \par Constitutional: healthy appearing, NAD, and obese\par \par BACK:\par no swelling, erythema, warmth\par flexion to 30 deg, ext to 5 deg\par 5/5 bilat LE\par sensation intact in bilat LE\par walks with cane, poor balance\par TTP in right gluteus bianca and SI joint\par

## 2023-04-14 NOTE — PROCEDURE
[de-identified] : Indication: myofascial pain\par \par After informed consent,she elected to proceed with a trigger point injection into the right gluteus bianca in 2 spots. I confirmed no prior adverse reactions, no active infections, and no relevant allergies. \par  \par The skin was prepped in the usual sterile manner.  The sites were injected with Lidocaine followed by local needling. The injection was completed without complication and a bandage was applied. She tolerated the procedure well and was given post-injection instructions. \par  \par Cold Tx x 48 hours, analgesics prn. Medications: 0.5 ml of 1% Lidocaine per site\par \par Exp 1/2024\par Manufacture: [employer]\par NDC 59990-270-90\par LOT [id number]\par \par

## 2023-04-14 NOTE — HISTORY OF PRESENT ILLNESS
[FreeTextEntry1] : Location: back and coccyx\par Quality: sharp \par Severity: mild in back, 7/10 in coccyx\par Duration: few years \par Timing: recurrent \par Context: atraumatic \par Aggravating Factors: walking, sitting\par Alleviating Factors: KELLE; RF \par Associated Symptoms: no weakness; no weight loss; no fever; no chills; no change in bowel/bladder habits; numbness/tingling (tingling in legs)\par Prior Studies: MRI 2021; MRI pelvis\par 12/2018 left L3, L4, L5 RFA - no more pain with oblique ext\par 1/2019 left SI joint injection\par 7/2019 left SI joint injection\par 9/2019 right SI joint injection \par 10/2020 left SI joint injection\par 10/2020 right SI joint injection\par 1/2021 left L3, L4, L5 RFA\par 2/2021 bilateral L5 KELLE\par 3/2021 right SI joint injection\par 11/2021 ganglion impars block and coccyx injection - significant relief\par 12/2021 laminectomy and laminotomy with Dr Mak Perales\par mbb with Unruly Mcdaniel

## 2023-04-14 NOTE — ASSESSMENT
[FreeTextEntry1] : coccyx - discussed repeat inj\par but she must limit putting pressure on coccyx.  Get good chair\par use proper coccyx cushion daily\par \par SI - consider repeat right injection after coccyx inj\par \par spondylosis - consider mbb

## 2023-05-12 ENCOUNTER — APPOINTMENT (OUTPATIENT)
Dept: PHYSICAL MEDICINE AND REHAB | Facility: CLINIC | Age: 73
End: 2023-05-12
Payer: MEDICARE

## 2023-05-12 DIAGNOSIS — M75.32 CALCIFIC TENDINITIS OF LEFT SHOULDER: ICD-10-CM

## 2023-05-12 DIAGNOSIS — M47.816 SPONDYLOSIS W/OUT MYELOPATHY OR RADICULOPATHY, LUMBAR REGION: ICD-10-CM

## 2023-05-12 DIAGNOSIS — M53.3 SACROCOCCYGEAL DISORDERS, NOT ELSEWHERE CLASSIFIED: ICD-10-CM

## 2023-05-12 DIAGNOSIS — M79.18 MYALGIA, OTHER SITE: ICD-10-CM

## 2023-05-12 PROCEDURE — 99213 OFFICE O/P EST LOW 20 MIN: CPT | Mod: 25

## 2023-05-12 PROCEDURE — 20552 NJX 1/MLT TRIGGER POINT 1/2: CPT

## 2023-05-12 NOTE — PROCEDURE
[de-identified] : Indication: myofascial pain\par \par After informed consent,she elected to proceed with a trigger point injection into the left C6, C7, T1 paraspinals. I confirmed no prior adverse reactions, no active infections, and no relevant allergies. \par  \par The skin was prepped in the usual sterile manner. The muscles were pinched and elevated from the chest wall to avoid puncturing the lung. The sites were injected with local anesthetic followed by local needling. The injection was completed without complication and a bandage was applied. She tolerated the procedure well and was given post-injection instructions. \par  \par Cold Tx x 48 hours, analgesics prn. Medications: 0.5 ml of 1% Lidocaine per site\par \par \par Exp 1/2024\par Manufacture: [employer] kabi\par NDC 80680-494-59\par LOT [id number]\par

## 2023-05-12 NOTE — HISTORY OF PRESENT ILLNESS
[FreeTextEntry1] : Location: back and coccyx\par Quality: sharp \par Severity: mild in back, 8/10 in coccyx\par Duration: few years \par Timing: recurrent \par Context: atraumatic \par Aggravating Factors: walking, sitting\par Alleviating Factors: KELLE; RF \par Associated Symptoms: no weakness; no weight loss; no fever; no chills; no change in bowel/bladder habits; numbness, tingling in legs\par Prior Studies: MRI 2021; MRI pelvis\par 10/2020 left SI joint injection\par 10/2020 right SI joint injection\par 1/2021 left L3, L4, L5 RFA\par 2/2021 bilateral L5 KELLE\par 3/2021 right SI joint injection\par 11/2021 ganglion impars block and coccyx injection - significant relief\par 12/2021 laminectomy and laminotomy with Dr Mak Perales\par mbb with Unruly Mcdaniel \par facet injection help coccyx pain\par \par Left shoulder and neck hurting again.  Saw Dr Barnes and got a steroid injection. MR cervical 2021.\par 5/2022 left subacromial injection \par 9/2022 left subacromial injection \par \par

## 2023-05-12 NOTE — PHYSICAL EXAM
[FreeTextEntry1] : BEN is a 73 year old female \par \par Constitutional: healthy appearing, NAD, and obese\par \par BACK:\par no swelling, erythema, warmth\par flexion to 30 deg, ext to 5 deg\par 5/5 bilat LE\par sensation intact in bilat LE\par walks with cane, poor balance\par TTP in right gluteus bianca and SI joint\par \par left shoulder:\par abd to

## 2023-05-12 NOTE — ASSESSMENT
[FreeTextEntry1] : coccyx - discussed repeat inj\par but she must limit putting pressure on coccyx. Get good chair\par use proper coccyx cushion daily\par \par SI - consider repeat right injection after coccyx inj\par \par cervical - just had MRI at [facility] so bring me report\par avoid neck flexion bryan in downward dog\par stop nsaids after today for possible injection\par \par shoulder - had MRI at [facility] so bring me report\par consider GH injection

## 2023-05-15 ENCOUNTER — APPOINTMENT (OUTPATIENT)
Dept: PHYSICAL MEDICINE AND REHAB | Facility: CLINIC | Age: 73
End: 2023-05-15
Payer: MEDICARE

## 2023-05-15 DIAGNOSIS — M47.812 SPONDYLOSIS W/OUT MYELOPATHY OR RADICULOPATHY, CERVICAL REGION: ICD-10-CM

## 2023-05-15 PROCEDURE — 64491 INJ PARAVERT F JNT C/T 2 LEV: CPT | Mod: LT

## 2023-05-15 PROCEDURE — 64490 INJ PARAVERT F JNT C/T 1 LEV: CPT | Mod: LT

## 2023-05-15 RX ORDER — CONJUGATED ESTROGENS 0.62 MG/G
0.62 CREAM VAGINAL
Qty: 30 | Refills: 0 | Status: ACTIVE | COMMUNITY
Start: 2023-02-13

## 2023-05-15 RX ORDER — PREGABALIN 25 MG/1
25 CAPSULE ORAL
Qty: 60 | Refills: 0 | Status: ACTIVE | COMMUNITY
Start: 2023-04-19

## 2023-05-15 RX ORDER — CLOBETASOL PROPIONATE 0.5 MG/ML
0.05 SOLUTION TOPICAL
Qty: 50 | Refills: 0 | Status: ACTIVE | COMMUNITY
Start: 2023-04-25

## 2023-05-15 RX ORDER — CICLOPIROX OLAMINE 7.7 MG/G
0.77 CREAM TOPICAL
Qty: 30 | Refills: 0 | Status: ACTIVE | COMMUNITY
Start: 2022-12-15

## 2023-05-15 RX ORDER — NALTREXONE HYDROCHLORIDE 50 MG/1
50 TABLET, FILM COATED ORAL
Qty: 90 | Refills: 0 | Status: ACTIVE | COMMUNITY
Start: 2023-04-25

## 2023-05-15 RX ORDER — GUAIFENESIN AND CODEINE PHOSPHATE 10; 100 MG/5ML; MG/5ML
100-10 SOLUTION ORAL
Qty: 240 | Refills: 0 | Status: ACTIVE | COMMUNITY
Start: 2023-03-07

## 2023-05-15 RX ORDER — TRIAMCINOLONE ACETONIDE 1 MG/G
0.1 OINTMENT TOPICAL
Qty: 80 | Refills: 0 | Status: ACTIVE | COMMUNITY
Start: 2023-04-25

## 2023-05-15 NOTE — PROCEDURE
[de-identified] : indication: pain\par \par Fluoroscopically Guided, Contrast Enhanced, Left C3/4 and C4/5 Facet Injections\par \par After informed consent, she  was placed sidelying on the fluoroscopy table. Skin over the area was prepped and draped in the usual sterile fashion before being anesthetized with 1% Lidocaine. Then using an lateral approach, a 2 in spinal needle was directed down to the C3/4 facet joint. Needle placement was confirmed with AP fluoroscopic images. After negative aspiration for blood or cerebrospinal fluid, contrast was instilled. It showed good flow in the capsule. Then a steroid solution consisting of 5 mg Dexamethasone and 0.5 ml of 1% Lidocaine was instilled. The same procedure was repeated at the  C4/5 facet joint. \par \par She  tolerated the procedure well and was discharged in good condition without any complications or complaints.  She  was given discharge instructions and asked to follow-up in clinic in two weeks.\par \par  [employer]\par Dexamethasone\par NDC 5178245850\par Lot [id number]\par Exp 7/2024\par \par Lidocaine\par Manufacture [employer] Kabl\par [place] 628[address]-654-32\par Exp 6/24\par LOT [id number]\par \par Manufacture GE\par Omnipaque 240\par NDC [id number]\par Exp 5/4/24\par [id number]\par

## 2023-05-15 NOTE — ASSESSMENT
[FreeTextEntry1] : Bilateral L5/S1 facet injection with Dr Mcdaniel helped coccyx pain so she wanted neck injection. Going to ER after for bowel impaction. \par \par Check glucose for the next 1 wk and adjust meds.  Call PCP if too high.\par \par f/u after trip

## 2023-06-14 ENCOUNTER — APPOINTMENT (OUTPATIENT)
Dept: PHYSICAL MEDICINE AND REHAB | Facility: CLINIC | Age: 73
End: 2023-06-14

## 2023-09-06 ENCOUNTER — APPOINTMENT (OUTPATIENT)
Dept: OTOLARYNGOLOGY | Facility: CLINIC | Age: 73
End: 2023-09-06
Payer: MEDICARE

## 2023-09-06 VITALS — BODY MASS INDEX: 32.47 KG/M2 | WEIGHT: 172 LBS | HEIGHT: 61 IN

## 2023-09-06 DIAGNOSIS — J31.0 CHRONIC RHINITIS: ICD-10-CM

## 2023-09-06 DIAGNOSIS — T48.5X5A CHRONIC RHINITIS: ICD-10-CM

## 2023-09-06 PROCEDURE — 99213 OFFICE O/P EST LOW 20 MIN: CPT | Mod: 25

## 2023-09-06 PROCEDURE — 31231 NASAL ENDOSCOPY DX: CPT

## 2023-09-06 RX ORDER — PREDNISONE 20 MG/1
20 TABLET ORAL
Qty: 4 | Refills: 0 | Status: ACTIVE | COMMUNITY
Start: 2023-09-06 | End: 1900-01-01

## 2023-09-06 RX ORDER — INSULIN GLARGINE 300 U/ML
300 INJECTION, SOLUTION SUBCUTANEOUS
Qty: 6 | Refills: 0 | Status: DISCONTINUED | COMMUNITY
Start: 2020-09-28 | End: 2023-09-06

## 2023-09-06 RX ORDER — ESTRADIOL 2 MG/1
2 RING VAGINAL
Refills: 0 | Status: DISCONTINUED | COMMUNITY
End: 2023-09-06

## 2023-09-06 RX ORDER — INSULIN GLARGINE 300 U/ML
300 INJECTION, SOLUTION SUBCUTANEOUS
Refills: 0 | Status: DISCONTINUED | COMMUNITY
End: 2023-09-06

## 2023-09-06 NOTE — PHYSICAL EXAM
[TextEntry] : PHYSICAL EXAM  General: The patient was alert and oriented and in no distress. Voice was normal.  Neurologic: Cranial Nerves II-XII intact PERRLA There was no significant nystagmus or disconjugate gaze noted.  EOM's: Normal  Face: The patient had no facial asymmetry or mass. The skin was unremarkable.  Ears: External ears were normal without deformity. Ear canals were normal. Tympanic membranes were intact and normal. No perforation or effusion  Nose:  The external nose had no significant deformity.  There was no facial tenderness.  On anterior rhinoscopy, the nasal mucosa was normal.  The anterior septum was normal. There were no visualized polyps purulence  or masses.  Oral cavity: The oral mucosa was normal. The oral and base of tongue were clear and without mass. The gingival and buccal mucosa were moist and without lesions. The palate moved well. There was no cleft palate. There appeared to be good salivary flow.   There was no pus, erythema or mass in the oral cavity/oropharynx.  Neck:  The neck was symmetrical. The parotid and submandibular glands were normal without masses. The trachea was midline and there was no unusual crepitus. Thyroid was smooth and nontender and no masses were palpated. Cervical adenopathy- none.   Procedure: Nasal Endoscopy  Diagnosis: Chronic sinusitis  Dr. Nathen Lira  Anesthesia: Topical Lidocaine 2% and oxymetazoline  Procedure: The fiberoptic endoscope was introduced into the right nostril and passed along the floor of the nose and then  along the middle meatus. The same procedure was carried out  on the left side.  Findings:   Septum: mildly deviated bilaterally         Right:    Middle Turbinate: normal Middle meatus congested no polyps or pus  Superior meatus:normal SER:normal Inferior Turbinate: normal  Left:      Middle Turbinate: normal Middle meatus congested no polyps or pus Superior meatus:normal SER:normal Inferior Turbinate: normal

## 2023-09-06 NOTE — HISTORY OF PRESENT ILLNESS
[de-identified] : BEN REAVES is a 73 year woman with a history of using oxymetazoline spray for 2 months.

## 2023-09-06 NOTE — REVIEW OF SYSTEMS
[Nasal Congestion] : nasal congestion [Negative] : Heme/Lymph [Patient Intake Form Reviewed] : Patient intake form was reviewed [de-identified] : Nasal Drainage and postnasal drip  [de-identified] : reflux/heartburn

## 2023-09-06 NOTE — REASON FOR VISIT
[Subsequent Evaluation] : a subsequent evaluation for [Nasal Obstruction] : nasal obstruction [FreeTextEntry2] : sinus

## 2023-09-06 NOTE — ASSESSMENT
[FreeTextEntry1] : BEN RODRIGUEZ has rhinitis medicamentosa. She si diabetic and has a monitor. I will prescribe prednisone 20mg x 4 days. I discussed the risks of taking steroid medication including the risk of, osteoporosis and bone, fracture, GI problems, cataracts and mood changes. The patient indicated to me that he understands the risks.  and Flonase. I will speak to her next week.

## 2023-11-28 ENCOUNTER — RX RENEWAL (OUTPATIENT)
Age: 73
End: 2023-11-28

## 2023-11-28 RX ORDER — IPRATROPIUM BROMIDE 21 UG/1
0.03 SPRAY NASAL
Qty: 30 | Refills: 3 | Status: ACTIVE | COMMUNITY
Start: 2022-02-23 | End: 1900-01-01

## 2023-12-02 RX ORDER — FLUTICASONE PROPIONATE 50 UG/1
50 SPRAY, METERED NASAL DAILY
Qty: 1 | Refills: 6 | Status: ACTIVE | COMMUNITY
Start: 2023-09-06 | End: 1900-01-01

## 2023-12-08 ENCOUNTER — APPOINTMENT (OUTPATIENT)
Dept: OPHTHALMOLOGY | Facility: CLINIC | Age: 73
End: 2023-12-08

## 2023-12-08 ENCOUNTER — OUTPATIENT (OUTPATIENT)
Dept: OUTPATIENT SERVICES | Facility: HOSPITAL | Age: 73
LOS: 1 days | End: 2023-12-08

## 2023-12-12 DIAGNOSIS — H40.003 PREGLAUCOMA, UNSPECIFIED, BILATERAL: ICD-10-CM

## 2024-01-25 ENCOUNTER — APPOINTMENT (OUTPATIENT)
Dept: PHYSICAL MEDICINE AND REHAB | Facility: CLINIC | Age: 74
End: 2024-01-25
Payer: MEDICARE

## 2024-01-25 DIAGNOSIS — M51.26 OTHER INTERVERTEBRAL DISC DISPLACEMENT, LUMBAR REGION: ICD-10-CM

## 2024-01-25 DIAGNOSIS — M54.16 RADICULOPATHY, LUMBAR REGION: ICD-10-CM

## 2024-01-25 DIAGNOSIS — M75.42 IMPINGEMENT SYNDROME OF LEFT SHOULDER: ICD-10-CM

## 2024-01-25 DIAGNOSIS — M19.012 PRIMARY OSTEOARTHRITIS, LEFT SHOULDER: ICD-10-CM

## 2024-01-25 PROCEDURE — 99213 OFFICE O/P EST LOW 20 MIN: CPT

## 2024-02-07 ENCOUNTER — APPOINTMENT (OUTPATIENT)
Dept: PHYSICAL MEDICINE AND REHAB | Facility: CLINIC | Age: 74
End: 2024-02-07
Payer: MEDICARE

## 2024-02-07 DIAGNOSIS — M48.061 SPINAL STENOSIS, LUMBAR REGION WITHOUT NEUROGENIC CLAUDICATION: ICD-10-CM

## 2024-02-07 PROCEDURE — 64483 NJX AA&/STRD TFRM EPI L/S 1: CPT | Mod: RT

## 2024-02-07 PROCEDURE — 64484 NJX AA&/STRD TFRM EPI L/S EA: CPT | Mod: RT

## 2024-02-07 RX ORDER — PREGABALIN 20 MG/ML
SOLUTION ORAL
Refills: 0 | Status: DISCONTINUED | COMMUNITY
End: 2024-02-07

## 2024-02-07 NOTE — ASSESSMENT
[FreeTextEntry1] :  She  understands risk of immunosuppression from steroids.  Do not get covid vaccine for 2 wk.  f/u 1 wk

## 2024-02-07 NOTE — PROCEDURE
[de-identified] : indication: pain   Fluoroscopically Guided, Contrast Enhanced, Right L5 and S1 Epidural Steroid Injection   After informed consent, She was placed prone on the fluoroscopy table. Skin over the area was prepped and draped in the usual sterile fashion before being anesthetized with 1% Lidocaine. Then using an oblique approach, a 22 gauge 5 in spinal needle was directed down to the L5/S1 NF.   Needle placement was confirmed with AP fluoroscopic images. After negative aspiration for blood or cerebrospinal fluid, contrast was instilled under live fluoroscopy and an epidurogram was obtained. It showed good epidural flow without any vascular uptake. Then a steroid solution consisting of 20 mg of Kenalog, 1 ml of 0.5% Lidocaine was instilled.   Then using a 22 gauge 3.5 in spinal needle was directed down to the S1 foramen.   Needle placement was confirmed with lateral fluoroscopic images. After negative aspiration for blood or cerebrospinal fluid, contrast was instilled under live fluoroscopy and an epidurogram was obtained. It showed good epidural flow without any vascular uptake. Then a steroid solution consisting of 20 mg of Kenalog, 1.5 ml of 0.5% Lidocaine was instilled.   She  was discharged in good condition.  Instructions given:  No soaking or bathing for 2 days but can take a shower.  No strenuous exercise for 4 days.     Manufacture GE Omnipaque 180 NDC [id number] Exp 12/15/25 [id number]      Lidocaine Manufacture [employer] Kabl NDC 82566-371-26 Exp 6/24 LOT [id number]   Triamcinolone NDC [address]-2 Exp 6/24 Lot [id number] Manufacture [employer]

## 2024-02-21 ENCOUNTER — APPOINTMENT (OUTPATIENT)
Dept: ORTHOPEDIC SURGERY | Facility: CLINIC | Age: 74
End: 2024-02-21

## 2024-07-09 ENCOUNTER — TRANSCRIPTION ENCOUNTER (OUTPATIENT)
Age: 74
End: 2024-07-09

## 2024-07-10 ENCOUNTER — TRANSCRIPTION ENCOUNTER (OUTPATIENT)
Age: 74
End: 2024-07-10

## 2024-07-10 ENCOUNTER — OUTPATIENT (OUTPATIENT)
Dept: OUTPATIENT SERVICES | Facility: HOSPITAL | Age: 74
LOS: 1 days | Discharge: HOME | End: 2024-07-10
Payer: SELF-PAY

## 2024-07-10 VITALS
SYSTOLIC BLOOD PRESSURE: 117 MMHG | DIASTOLIC BLOOD PRESSURE: 54 MMHG | HEART RATE: 65 BPM | OXYGEN SATURATION: 95 % | TEMPERATURE: 97 F | RESPIRATION RATE: 12 BRPM

## 2024-07-10 VITALS
SYSTOLIC BLOOD PRESSURE: 107 MMHG | HEART RATE: 52 BPM | HEIGHT: 60.5 IN | DIASTOLIC BLOOD PRESSURE: 52 MMHG | OXYGEN SATURATION: 99 % | TEMPERATURE: 97 F | WEIGHT: 145.06 LBS | RESPIRATION RATE: 16 BRPM

## 2024-07-10 DIAGNOSIS — Z98.890 OTHER SPECIFIED POSTPROCEDURAL STATES: Chronic | ICD-10-CM

## 2024-07-10 LAB — GLUCOSE BLDC GLUCOMTR-MCNC: 115 MG/DL — HIGH (ref 70–99)

## 2024-07-10 PROCEDURE — 88305 TISSUE EXAM BY PATHOLOGIST: CPT | Mod: 26

## 2024-07-10 RX ORDER — FENTANYL CITRATE 50 UG/ML
25 INJECTION, SOLUTION INTRAMUSCULAR; INTRAVENOUS
Refills: 0 | Status: DISCONTINUED | OUTPATIENT
Start: 2024-07-10 | End: 2024-07-10

## 2024-07-10 RX ORDER — SEMAGLUTIDE 1.34 MG/ML
1 INJECTION, SOLUTION SUBCUTANEOUS
Refills: 0 | DISCHARGE

## 2024-07-10 RX ORDER — LOSARTAN POTASSIUM 100 MG/1
1 TABLET, FILM COATED ORAL
Refills: 0 | DISCHARGE

## 2024-07-10 RX ORDER — LAMOTRIGINE 100 MG/1
2 TABLET ORAL
Refills: 0 | DISCHARGE

## 2024-07-10 RX ORDER — HYDROMORPHONE HCL 0.2 MG/ML
0.25 INJECTION, SOLUTION INTRAVENOUS
Refills: 0 | Status: DISCONTINUED | OUTPATIENT
Start: 2024-07-10 | End: 2024-07-10

## 2024-07-10 RX ORDER — ACETAMINOPHEN 325 MG
1000 TABLET ORAL ONCE
Refills: 0 | Status: COMPLETED | OUTPATIENT
Start: 2024-07-10 | End: 2024-07-10

## 2024-07-10 RX ORDER — METFORMIN HYDROCHLORIDE 850 MG/1
1 TABLET, FILM COATED ORAL
Refills: 0 | DISCHARGE

## 2024-07-10 RX ORDER — EMPAGLIFLOZIN 25 MG/1
1 TABLET, FILM COATED ORAL
Refills: 0 | DISCHARGE

## 2024-07-10 RX ORDER — DEXTROSE MONOHYDRATE AND SODIUM CHLORIDE 5; .3 G/100ML; G/100ML
1000 INJECTION, SOLUTION INTRAVENOUS
Refills: 0 | Status: DISCONTINUED | OUTPATIENT
Start: 2024-07-10 | End: 2024-07-10

## 2024-07-10 RX ORDER — DEXTROSE MONOHYDRATE AND SODIUM CHLORIDE 5; .3 G/100ML; G/100ML
1000 INJECTION, SOLUTION INTRAVENOUS ONCE
Refills: 0 | Status: COMPLETED | OUTPATIENT
Start: 2024-07-10 | End: 2024-07-10

## 2024-07-10 RX ORDER — ROSUVASTATIN CALCIUM 20 MG/1
1 TABLET ORAL
Refills: 0 | DISCHARGE

## 2024-07-10 RX ORDER — ACETAMINOPHEN 325 MG
650 TABLET ORAL ONCE
Refills: 0 | Status: DISCONTINUED | OUTPATIENT
Start: 2024-07-10 | End: 2024-07-10

## 2024-07-10 RX ORDER — PREGABALIN 50 MG/1
1 CAPSULE ORAL
Refills: 0 | DISCHARGE

## 2024-07-10 RX ORDER — OXYCODONE HYDROCHLORIDE 100 MG/5ML
5 SOLUTION ORAL ONCE
Refills: 0 | Status: DISCONTINUED | OUTPATIENT
Start: 2024-07-10 | End: 2024-07-10

## 2024-07-10 RX ORDER — MINOXIDIL 10 MG/1
2 TABLET ORAL
Refills: 0 | DISCHARGE

## 2024-07-10 RX ORDER — FLUOXETINE HCL 40 MG
1 CAPSULE ORAL
Refills: 0 | DISCHARGE

## 2024-07-10 RX ADMIN — DEXTROSE MONOHYDRATE AND SODIUM CHLORIDE 100 MILLILITER(S): 5; .3 INJECTION, SOLUTION INTRAVENOUS at 20:01

## 2024-07-10 RX ADMIN — Medication 400 MILLIGRAM(S): at 20:31

## 2024-07-10 RX ADMIN — Medication 1000 MILLIGRAM(S): at 20:46

## 2024-07-10 RX ADMIN — DEXTROSE MONOHYDRATE AND SODIUM CHLORIDE 1000 MILLILITER(S): 5; .3 INJECTION, SOLUTION INTRAVENOUS at 19:52

## 2024-07-25 LAB — SURGICAL PATHOLOGY STUDY: SIGNIFICANT CHANGE UP

## 2024-09-12 ENCOUNTER — APPOINTMENT (OUTPATIENT)
Dept: ORTHOPEDIC SURGERY | Facility: CLINIC | Age: 74
End: 2024-09-12

## 2024-09-12 PROBLEM — E78.00 PURE HYPERCHOLESTEROLEMIA, UNSPECIFIED: Chronic | Status: ACTIVE | Noted: 2024-07-10

## 2024-09-20 ENCOUNTER — APPOINTMENT (OUTPATIENT)
Dept: ORTHOPEDIC SURGERY | Facility: CLINIC | Age: 74
End: 2024-09-20
Payer: MEDICARE

## 2024-09-20 VITALS — BODY MASS INDEX: 23.41 KG/M2 | HEIGHT: 61 IN | WEIGHT: 124 LBS

## 2024-09-20 DIAGNOSIS — M17.0 BILATERAL PRIMARY OSTEOARTHRITIS OF KNEE: ICD-10-CM

## 2024-09-20 PROCEDURE — 73562 X-RAY EXAM OF KNEE 3: CPT | Mod: 50

## 2024-09-20 PROCEDURE — 99214 OFFICE O/P EST MOD 30 MIN: CPT

## 2024-09-20 NOTE — PHYSICAL EXAM
[de-identified] : Left knee range of motion 0 to 130 degrees the knee is stable to stress varus valgus of both full extension and 9 degrees flexion there is some mild protuberance of the lateral aspect of the patella noted on palpation today but nonpainful no erythema no swelling.  Right knee also has a very benign appearance today range of motion 0 to 125 degrees knee stable to stress varus valgus stress both full extension and 9 degrees of flexion there is minimal warmth no soft tissue swelling no effusion noted quad tone is good neurovascular intact in both lower extremities. [de-identified] : Knee radiographs were ordered today AP standing individual and sunrise views were obtained showing well-positioned bilateral Sigma knee replacements with the right having a extended tibial stem.  No evidence of any loosening wear or migration lysis or other abnormalities noted.  The sunrise projection of the left knee indicates a patella spur that is fractured in the past but not an acute injury.  This correlates with the fullness/mass that the patient noted on exam today.

## 2024-09-20 NOTE — DISCUSSION/SUMMARY
[de-identified] : Patient I reviewed the radiographs together I told both knees have very benign exams even the left knee with that mass is essentially nonpainful.  I told the patient that it correlates with the findings of that fractured overgrowth spur on the sunrise view of the left knee today.  I cautioned her to avoid any notion of having this removed with an open facetectomy due to the fact that we could introduce infection of the problems and essentially not painful at this point.  Patient agreed with this recommendation she will monitor her symptoms follow-up if symptoms worsen or persist to an unacceptable level.  Today's consultation lasted 35 minutes.

## 2024-09-20 NOTE — HISTORY OF PRESENT ILLNESS
[de-identified] : This is a first-time visit for this patient with me she underwent bilateral knee replacement surgeries approximate 10 years ago.  She has had no significant referable issues with either knee just has some mild anterolateral knee pain and has noticed a predominant mass there she is here to have it evaluated.

## 2024-09-20 NOTE — REASON FOR VISIT
[Initial Visit] : an initial visit for [Knee Pain] : knee pain [FreeTextEntry2] : left knee pains started a few months ago. noticed there is a little bump on the knee which hurts with tough.

## 2024-10-10 ENCOUNTER — NON-APPOINTMENT (OUTPATIENT)
Age: 74
End: 2024-10-10

## 2024-10-10 ENCOUNTER — APPOINTMENT (OUTPATIENT)
Dept: ORTHOPEDIC SURGERY | Facility: CLINIC | Age: 74
End: 2024-10-10
Payer: MEDICARE

## 2024-10-10 VITALS
BODY MASS INDEX: 23.41 KG/M2 | SYSTOLIC BLOOD PRESSURE: 112 MMHG | HEART RATE: 74 BPM | DIASTOLIC BLOOD PRESSURE: 63 MMHG | WEIGHT: 124 LBS | HEIGHT: 61 IN | OXYGEN SATURATION: 99 %

## 2024-10-10 DIAGNOSIS — Z78.9 OTHER SPECIFIED HEALTH STATUS: ICD-10-CM

## 2024-10-10 DIAGNOSIS — Z82.61 FAMILY HISTORY OF ARTHRITIS: ICD-10-CM

## 2024-10-10 DIAGNOSIS — M41.9 SCOLIOSIS, UNSPECIFIED: ICD-10-CM

## 2024-10-10 DIAGNOSIS — M54.50 LOW BACK PAIN, UNSPECIFIED: ICD-10-CM

## 2024-10-10 PROCEDURE — 99202 OFFICE O/P NEW SF 15 MIN: CPT

## 2024-10-28 ENCOUNTER — APPOINTMENT (OUTPATIENT)
Dept: PHYSICAL MEDICINE AND REHAB | Facility: CLINIC | Age: 74
End: 2024-10-28
Payer: MEDICARE

## 2024-10-28 PROCEDURE — 64494 INJ PARAVERT F JNT L/S 2 LEV: CPT | Mod: RT

## 2024-10-28 PROCEDURE — 64493 INJ PARAVERT F JNT L/S 1 LEV: CPT | Mod: RT

## 2024-11-04 ENCOUNTER — APPOINTMENT (OUTPATIENT)
Dept: PHYSICAL MEDICINE AND REHAB | Facility: CLINIC | Age: 74
End: 2024-11-04
Payer: MEDICARE

## 2024-11-04 PROCEDURE — 64494 INJ PARAVERT F JNT L/S 2 LEV: CPT | Mod: RT

## 2024-11-04 PROCEDURE — 64493 INJ PARAVERT F JNT L/S 1 LEV: CPT | Mod: RT

## 2024-11-11 ENCOUNTER — APPOINTMENT (OUTPATIENT)
Dept: PHYSICAL MEDICINE AND REHAB | Facility: CLINIC | Age: 74
End: 2024-11-11
Payer: MEDICARE

## 2024-11-11 DIAGNOSIS — M47.816 SPONDYLOSIS W/OUT MYELOPATHY OR RADICULOPATHY, LUMBAR REGION: ICD-10-CM

## 2024-11-11 DIAGNOSIS — M19.012 PRIMARY OSTEOARTHRITIS, LEFT SHOULDER: ICD-10-CM

## 2024-11-11 DIAGNOSIS — M19.011 PRIMARY OSTEOARTHRITIS, RIGHT SHOULDER: ICD-10-CM

## 2024-11-11 PROCEDURE — 73030 X-RAY EXAM OF SHOULDER: CPT | Mod: 50

## 2024-11-11 PROCEDURE — 64636Z: CUSTOM | Mod: RT

## 2024-11-11 PROCEDURE — 64635 DESTROY LUMB/SAC FACET JNT: CPT | Mod: RT

## 2024-12-10 RX ORDER — APREPITANT 40 MG/1
40 CAPSULE ORAL ONCE
Refills: 0 | Status: DISCONTINUED | OUTPATIENT
Start: 2024-12-11 | End: 2024-12-11

## 2024-12-10 NOTE — ASU PATIENT PROFILE, ADULT - FALL HARM RISK - RISK INTERVENTIONS

## 2024-12-10 NOTE — ASU PATIENT PROFILE, ADULT - NSICDXPASTMEDICALHX_GEN_ALL_CORE_FT
DVT, lower extremity PAST MEDICAL HISTORY:  High blood pressure Bp med dosage recently adjusted  cut to 1/2 dose due to low BP    High cholesterol     History of ataxia uses walker    Personal history of other specified diseases H/O: obesity    Sleep apnea resolved with weight loss    Type 2 diabetes mellitus DM type 2 (diabetes mellitus, type 2)

## 2024-12-10 NOTE — ASU PATIENT PROFILE, ADULT - NS PRO INFO GIVEN TO
NPO from midnight on. Follow Surgeons instruction s Re. Medications  on morning of Sx. , Bring photo ID, Insurance and  credit Card, remove all jewelry and leave it home , no contacts , loose fitting comfortable clothing  appropriate for planned procedure/patient

## 2024-12-10 NOTE — ASU PATIENT PROFILE, ADULT - BLOOD TRANSFUSION, PREVIOUS, PROFILE
From: Roni Elaine  To: Filiberto Valencia MD  Sent: 7/19/2018 8:55 AM CDT  Subject: PSA follow up to your msg    Dr. Valencia: Thanks for the continuing insight and I'll take your advice as going to see a urologist still under a reading of 8 for the PSA then makes little sense. So long as we know I'm good until the next visit, or until you might decide it should be rechecked.   Regarding the stoppage of the Simvastatin med, am still waiting for the stillness to reduce. It's only been a couple of days and I'm hopeful. I am still taking Ibuprofen to help with the pain management and am asking if that is OK? Will of course try to reduce the amount as the body starts to react positively.   AMB  
no

## 2024-12-10 NOTE — ASU PATIENT PROFILE, ADULT - NSICDXPASTSURGICALHX_GEN_ALL_CORE_FT
PAST SURGICAL HISTORY:  H/O blepharoplasty     H/O foot surgery multiple    H/O laminectomy     H/O repair of rotator cuff     History of breast lift     History of facelift     History of total knee replacement right and left    Other acquired absence of organ History of cholecystectomy    Other postprocedural status H/O arthroscopy of right knee    S/P brachioplasty

## 2024-12-11 ENCOUNTER — TRANSCRIPTION ENCOUNTER (OUTPATIENT)
Age: 74
End: 2024-12-11

## 2024-12-11 ENCOUNTER — OUTPATIENT (OUTPATIENT)
Dept: OUTPATIENT SERVICES | Facility: HOSPITAL | Age: 74
LOS: 1 days | Discharge: HOME | End: 2024-12-11

## 2024-12-11 VITALS
DIASTOLIC BLOOD PRESSURE: 40 MMHG | RESPIRATION RATE: 16 BRPM | SYSTOLIC BLOOD PRESSURE: 102 MMHG | TEMPERATURE: 98 F | WEIGHT: 141.32 LBS | HEART RATE: 67 BPM | OXYGEN SATURATION: 98 % | HEIGHT: 61 IN

## 2024-12-11 VITALS
HEART RATE: 70 BPM | OXYGEN SATURATION: 96 % | TEMPERATURE: 98 F | DIASTOLIC BLOOD PRESSURE: 54 MMHG | SYSTOLIC BLOOD PRESSURE: 129 MMHG | RESPIRATION RATE: 16 BRPM

## 2024-12-11 DIAGNOSIS — Z98.890 OTHER SPECIFIED POSTPROCEDURAL STATES: Chronic | ICD-10-CM

## 2024-12-11 DEVICE — CLIP APPLIER ETHICON LIGACLIP 11.5" MEDIUM: Type: IMPLANTABLE DEVICE | Status: FUNCTIONAL

## 2024-12-11 DEVICE — VISTASEAL FIBRIN HUMAN 10ML: Type: IMPLANTABLE DEVICE | Status: FUNCTIONAL

## 2024-12-11 DEVICE — CLIP APPLIER COVIDIEN SURGICLIP III 9" SM: Type: IMPLANTABLE DEVICE | Status: FUNCTIONAL

## 2024-12-11 RX ORDER — FENTANYL 12 UG/H
25 PATCH, EXTENDED RELEASE TRANSDERMAL
Refills: 0 | Status: DISCONTINUED | OUTPATIENT
Start: 2024-12-11 | End: 2024-12-11

## 2024-12-11 RX ORDER — ONDANSETRON HYDROCHLORIDE 4 MG/1
4 TABLET, FILM COATED ORAL ONCE
Refills: 0 | Status: DISCONTINUED | OUTPATIENT
Start: 2024-12-11 | End: 2024-12-11

## 2024-12-11 RX ORDER — ACETAMINOPHEN 500MG 500 MG/1
1000 TABLET, COATED ORAL ONCE
Refills: 0 | Status: COMPLETED | OUTPATIENT
Start: 2024-12-11 | End: 2024-12-11

## 2024-12-11 RX ORDER — 0.9 % SODIUM CHLORIDE 0.9 %
500 INTRAVENOUS SOLUTION INTRAVENOUS
Refills: 0 | Status: DISCONTINUED | OUTPATIENT
Start: 2024-12-11 | End: 2024-12-11

## 2024-12-11 RX ADMIN — ACETAMINOPHEN 500MG 400 MILLIGRAM(S): 500 TABLET, COATED ORAL at 14:40

## 2024-12-11 NOTE — ASU PREOP CHECKLIST - 1.
Initial BP on admit 101/34 (RA), 1st Repeat 102/40 (RA),  2nd repeat 99/35 (LA) Pt asymptomatic denies any lightheadedness, or weakness. Per Pt has recently had BP med Decreased BY HALF from 100mg to 50mg (Losartan) due to increased episodes of Hypotension. Dr Hidalgo made aware and spoke to Pt, Repeat BP done at Dr. Hidalgo's request. 106/44. (RA) Small adult cuff used. Pt took am metformin this am . Per Dr Hidalgo Pt Stood up without C/o dizziness or weakness. Pt remains asymptomatic  7:18am Pt seen by Anesthesia Dr. Wheeler  Bilateral arm BP done at this time BP with Small adult cuff  111/51 (RA), 114/51 (LA). Pt Ok'd By Dr. Wheeler to proceed to OR

## 2024-12-11 NOTE — ASU DISCHARGE PLAN (ADULT/PEDIATRIC) - NS MD DC FALL RISK RISK
For information on Fall & Injury Prevention, visit: [email or web address] OR  [email or web address] OR  https://www.cdc.gov/steadi/patient.html

## 2024-12-11 NOTE — ASU DISCHARGE PLAN (ADULT/PEDIATRIC) - FINANCIAL ASSISTANCE
[facility] provides services at a reduced cost to those who are determined to be eligible through [facility]’s financial assistance program. Information regarding [facility]’s financial assistance program can be found by going to [email or web address] or by calling [phone].

## 2024-12-11 NOTE — ASU DISCHARGE PLAN (ADULT/PEDIATRIC) - CARE PROVIDER_API CALL
Sapna Hidalgo.  Plastic Surgery  [address] [place] [id number]  Phone: [phone]  Fax: [phone]  Follow Up Time:

## 2024-12-18 PROBLEM — Z87.898 PERSONAL HISTORY OF OTHER SPECIFIED CONDITIONS: Chronic | Status: ACTIVE | Noted: 2024-07-10

## 2024-12-18 PROBLEM — I10 ESSENTIAL (PRIMARY) HYPERTENSION: Chronic | Status: ACTIVE | Noted: 2024-07-10

## 2025-01-08 ENCOUNTER — APPOINTMENT (OUTPATIENT)
Dept: OTOLARYNGOLOGY | Facility: CLINIC | Age: 75
End: 2025-01-08
Payer: MEDICARE

## 2025-01-08 ENCOUNTER — NON-APPOINTMENT (OUTPATIENT)
Age: 75
End: 2025-01-08

## 2025-01-08 DIAGNOSIS — J31.0 CHRONIC RHINITIS: ICD-10-CM

## 2025-01-08 DIAGNOSIS — J30.0 VASOMOTOR RHINITIS: ICD-10-CM

## 2025-01-08 DIAGNOSIS — H91.13 PRESBYCUSIS, BILATERAL: ICD-10-CM

## 2025-01-08 DIAGNOSIS — H90.3 SENSORINEURAL HEARING LOSS, BILATERAL: ICD-10-CM

## 2025-01-08 PROCEDURE — 92567 TYMPANOMETRY: CPT

## 2025-01-08 PROCEDURE — 92557 COMPREHENSIVE HEARING TEST: CPT

## 2025-01-08 PROCEDURE — 99213 OFFICE O/P EST LOW 20 MIN: CPT

## 2025-01-08 RX ORDER — FLUTICASONE PROPIONATE 50 UG/1
50 SPRAY, METERED NASAL DAILY
Qty: 1 | Refills: 3 | Status: ACTIVE | COMMUNITY
Start: 2025-01-08 | End: 1900-01-01

## 2025-01-08 RX ORDER — IPRATROPIUM BROMIDE 42 UG/1
0.06 SPRAY, METERED NASAL
Qty: 1 | Refills: 2 | Status: ACTIVE | COMMUNITY
Start: 2025-01-08 | End: 1900-01-01

## 2025-01-16 ENCOUNTER — APPOINTMENT (OUTPATIENT)
Dept: OPHTHALMOLOGY | Facility: CLINIC | Age: 75
End: 2025-01-16

## 2025-01-16 ENCOUNTER — OUTPATIENT (OUTPATIENT)
Dept: OUTPATIENT SERVICES | Facility: HOSPITAL | Age: 75
LOS: 1 days | End: 2025-01-16

## 2025-01-16 DIAGNOSIS — Z98.890 OTHER SPECIFIED POSTPROCEDURAL STATES: Chronic | ICD-10-CM

## 2025-01-24 ENCOUNTER — APPOINTMENT (OUTPATIENT)
Dept: OTOLARYNGOLOGY | Facility: CLINIC | Age: 75
End: 2025-01-24
Payer: SELF-PAY

## 2025-01-24 PROCEDURE — V5299J: CUSTOM

## 2025-04-30 ENCOUNTER — APPOINTMENT (OUTPATIENT)
Dept: OTOLARYNGOLOGY | Facility: CLINIC | Age: 75
End: 2025-04-30
Payer: MEDICARE

## 2025-04-30 DIAGNOSIS — H92.03 OTALGIA, BILATERAL: ICD-10-CM

## 2025-04-30 DIAGNOSIS — H90.3 SENSORINEURAL HEARING LOSS, BILATERAL: ICD-10-CM

## 2025-04-30 DIAGNOSIS — J30.0 VASOMOTOR RHINITIS: ICD-10-CM

## 2025-04-30 PROCEDURE — 99213 OFFICE O/P EST LOW 20 MIN: CPT

## 2025-05-23 ENCOUNTER — APPOINTMENT (OUTPATIENT)
Dept: PHYSICAL MEDICINE AND REHAB | Facility: CLINIC | Age: 75
End: 2025-05-23
Payer: MEDICARE

## 2025-05-23 DIAGNOSIS — M48.061 SPINAL STENOSIS, LUMBAR REGION WITHOUT NEUROGENIC CLAUDICATION: ICD-10-CM

## 2025-05-23 DIAGNOSIS — M75.42 IMPINGEMENT SYNDROME OF LEFT SHOULDER: ICD-10-CM

## 2025-05-23 PROCEDURE — 99213 OFFICE O/P EST LOW 20 MIN: CPT

## 2025-06-13 ENCOUNTER — APPOINTMENT (OUTPATIENT)
Dept: PHYSICAL MEDICINE AND REHAB | Facility: CLINIC | Age: 75
End: 2025-06-13
Payer: MEDICARE

## 2025-06-13 PROCEDURE — 64483 NJX AA&/STRD TFRM EPI L/S 1: CPT | Mod: RT

## 2025-06-13 PROCEDURE — 64484 NJX AA&/STRD TFRM EPI L/S EA: CPT | Mod: RT

## 2025-06-13 RX ORDER — TIRZEPATIDE 7.5 MG/.5ML
INJECTION, SOLUTION SUBCUTANEOUS
Refills: 0 | Status: ACTIVE | COMMUNITY

## 2025-07-08 RX ORDER — BUPIVACAINE 13.3 MG/ML
20 INJECTION, SUSPENSION, LIPOSOMAL INFILTRATION ONCE
Refills: 0 | Status: DISCONTINUED | OUTPATIENT
Start: 2025-07-09 | End: 2025-07-09

## 2025-07-08 NOTE — ASU PATIENT PROFILE, ADULT - NS PREOP UNDERSTANDS INFO
NPO solids after midnight 7/8/25, stop clears after 4:30 am on DOS, bring insurance card and photo ID, Escort to bring photo ID, address and phone # reviewed with pt./yes

## 2025-07-08 NOTE — ASU PATIENT PROFILE, ADULT - FALL HARM RISK - UNIVERSAL INTERVENTIONS
Bed in lowest position, wheels locked, appropriate side rails in place/Call bell, personal items and telephone in reach/Instruct patient to call for assistance before getting out of bed or chair/Non-slip footwear when patient is out of bed/[employer] to call system/Physically safe environment - no spills, clutter or unnecessary equipment/Purposeful Proactive Rounding/Room/bathroom lighting operational, light cord in reach

## 2025-07-08 NOTE — ASU PATIENT PROFILE, ADULT - NSICDXPASTMEDICALHX_GEN_ALL_CORE_FT
PAST MEDICAL HISTORY:  High blood pressure Bp med dosage recently adjusted  cut to 1/2 dose due to low BP    High cholesterol     History of ataxia uses walker    Personal history of other specified diseases H/O: obesity    Sleep apnea resolved with weight loss    Type 2 diabetes mellitus DM type 2 (diabetes mellitus, type 2)

## 2025-07-09 ENCOUNTER — OUTPATIENT (OUTPATIENT)
Dept: OUTPATIENT SERVICES | Facility: HOSPITAL | Age: 75
LOS: 1 days | Discharge: HOME | End: 2025-07-09

## 2025-07-09 ENCOUNTER — TRANSCRIPTION ENCOUNTER (OUTPATIENT)
Age: 75
End: 2025-07-09

## 2025-07-09 VITALS
DIASTOLIC BLOOD PRESSURE: 43 MMHG | WEIGHT: 126.77 LBS | HEIGHT: 60.5 IN | OXYGEN SATURATION: 99 % | TEMPERATURE: 98 F | SYSTOLIC BLOOD PRESSURE: 109 MMHG | HEART RATE: 64 BPM | RESPIRATION RATE: 14 BRPM

## 2025-07-09 VITALS
RESPIRATION RATE: 17 BRPM | SYSTOLIC BLOOD PRESSURE: 120 MMHG | DIASTOLIC BLOOD PRESSURE: 74 MMHG | OXYGEN SATURATION: 97 % | HEART RATE: 81 BPM

## 2025-07-09 DIAGNOSIS — Z98.890 OTHER SPECIFIED POSTPROCEDURAL STATES: Chronic | ICD-10-CM

## 2025-07-09 DEVICE — VISTASEAL FIBRIN HUMAN 10ML: Type: IMPLANTABLE DEVICE | Site: BILATERAL | Status: FUNCTIONAL

## 2025-07-09 RX ORDER — ACETAMINOPHEN 500 MG/5ML
1000 LIQUID (ML) ORAL ONCE
Refills: 0 | Status: COMPLETED | OUTPATIENT
Start: 2025-07-09 | End: 2025-07-09

## 2025-07-09 RX ORDER — ONDANSETRON HCL/PF 4 MG/2 ML
4 VIAL (ML) INJECTION ONCE
Refills: 0 | Status: DISCONTINUED | OUTPATIENT
Start: 2025-07-09 | End: 2025-07-09

## 2025-07-09 RX ORDER — FENTANYL CITRATE-0.9 % NACL/PF 100MCG/2ML
25 SYRINGE (ML) INTRAVENOUS
Refills: 0 | Status: DISCONTINUED | OUTPATIENT
Start: 2025-07-09 | End: 2025-07-09

## 2025-07-09 RX ORDER — SODIUM CHLORIDE 9 G/1000ML
500 INJECTION, SOLUTION INTRAVENOUS
Refills: 0 | Status: DISCONTINUED | OUTPATIENT
Start: 2025-07-09 | End: 2025-07-09

## 2025-07-09 RX ORDER — OXYCODONE HYDROCHLORIDE 30 MG/1
5 TABLET ORAL ONCE
Refills: 0 | Status: DISCONTINUED | OUTPATIENT
Start: 2025-07-09 | End: 2025-07-09

## 2025-07-09 RX ORDER — TIRZEPATIDE 7.5 MG/.5ML
7.5 INJECTION, SOLUTION SUBCUTANEOUS
Refills: 0 | DISCHARGE

## 2025-07-09 RX ADMIN — Medication 25 MICROGRAM(S): at 16:00

## 2025-07-09 RX ADMIN — Medication 400 MILLIGRAM(S): at 16:15

## 2025-07-09 RX ADMIN — SODIUM CHLORIDE 50 MILLILITER(S): 9 INJECTION, SOLUTION INTRAVENOUS at 16:09

## 2025-07-09 RX ADMIN — Medication 1000 MILLIGRAM(S): at 17:00

## 2025-07-09 RX ADMIN — Medication 25 MICROGRAM(S): at 15:55

## 2025-07-09 RX ADMIN — Medication 25 MICROGRAM(S): at 16:15

## 2025-07-09 NOTE — ASU DISCHARGE PLAN (ADULT/PEDIATRIC) - ASU DC SPECIAL INSTRUCTIONSFT
Please refer to the postoperative instructions provided to you in Dr. Hidalgo's office. Please call her office with any questions or concerns.

## 2025-07-09 NOTE — ASU DISCHARGE PLAN (ADULT/PEDIATRIC) - PROCEDURE
[id number] abdominoplasty, bilateral vetical medial thighlift, bilateral midaxillary excisions [id number] abdominoplasty, bilateral vetical medial thighlift, bilateral midaxillary excisions

## 2025-07-09 NOTE — BRIEF OPERATIVE NOTE - NSICDXBRIEFPROCEDURE_GEN_ALL_CORE_FT
PROCEDURES:  Open abdominoplasty 09-Jul-2025 15:22:05  Tod Valera  Thigh lift 09-Jul-2025 15:22:15  Tod Valera

## 2025-07-09 NOTE — ASU DISCHARGE PLAN (ADULT/PEDIATRIC) - CARE PROVIDER_API CALL
Sapna Hidalgo.  Plastic Surgery  [address] [place] [id number]  Phone: [phone]  Fax: [phone]  Follow Up Time: Routine

## 2025-07-09 NOTE — ASU DISCHARGE PLAN (ADULT/PEDIATRIC) - FINANCIAL ASSISTANCE
Health system provides services at a reduced cost to those who are determined to be eligible through Health system’s financial assistance program. Information regarding Health system’s financial assistance program can be found by going to [email or web address] or by calling [phone].

## (undated) DEVICE — DRAPE TOP SHEET 53" X 101"

## (undated) DEVICE — SUT MONOCRYL 4-0 27" PS-2 UNDYED

## (undated) DEVICE — PACK ABDOMINOPLASTY

## (undated) DEVICE — DRAPE SPLIT SHEET 77" X 108"

## (undated) DEVICE — LAP PAD 18 X 18"

## (undated) DEVICE — DRSG STOCKINETTE IMPERVIOUS XL

## (undated) DEVICE — SUT ETHIBOND 0 30" CT-1 GREEN

## (undated) DEVICE — ELCTR PENCIL SMOKE EVACUATOR COATED PUSH BUTTON 70MM

## (undated) DEVICE — SUT MONOCRYL 5-0 18" P-3 UNDYED

## (undated) DEVICE — VENODYNE/SCD SLEEVE CALF MEDIUM

## (undated) DEVICE — DRSG XEROFORM 5 X 9"

## (undated) DEVICE — MEE-ESU VALLEYLAB T3C61360DX: Type: DURABLE MEDICAL EQUIPMENT

## (undated) DEVICE — DRSG DERMABOND PRINEO 60CM

## (undated) DEVICE — DRAIN RESERVOIR FOR JACKSON PRATT 100CC CARDINAL

## (undated) DEVICE — GOWN ROYAL SILK XL

## (undated) DEVICE — SUT MONOCRYL 3-0 27" PS-1 UNDYED

## (undated) DEVICE — SUT VICRYL PLUS 3-0 27" SH UNDYED

## (undated) DEVICE — PREP BETADINE KIT

## (undated) DEVICE — DRAPE MAGNETIC INSTRUMENT MEDIUM

## (undated) DEVICE — DRSG STERISTRIPS 1 X 5"

## (undated) DEVICE — SUT VICRYL PLUS 2-0 27" CT-1 UNDYED

## (undated) DEVICE — ABDOMINAL BINDER MED/LG 9" X 36"-64"

## (undated) DEVICE — Device

## (undated) DEVICE — SUT PROLENE 3-0 18" PS-2

## (undated) DEVICE — SUT PROLENE 3-0 18" PS-1

## (undated) DEVICE — SUT VICRYL PLUS 3-0 18" PS-2 UNDYED

## (undated) DEVICE — DRSG MASTISOL

## (undated) DEVICE — PACK BREAST

## (undated) DEVICE — ELCTR BOVIE TIP BLADE INSULATED 2.75" EDGE

## (undated) DEVICE — DRAPE TOWEL BLUE 17" X 24"

## (undated) DEVICE — SUT MONOCRYL 4-0 18" PS-2

## (undated) DEVICE — STAPLER SKIN VISI-STAT 35 WIDE

## (undated) DEVICE — DRSG KERLIX ROLL LG 4.5"

## (undated) DEVICE — DRAPE MEDIUM SHEET 44" X 70"

## (undated) DEVICE — TUBING MICROAIRE ASPIRATION SET 12FT

## (undated) DEVICE — SUT PROLENE 0 30" CT-2

## (undated) DEVICE — WARMING BLANKET LOWER ADULT

## (undated) DEVICE — POSITIONER FOAM EGG CRATE ULNAR 2PCS (PINK)

## (undated) DEVICE — DRSG KLING 4"

## (undated) DEVICE — SUT MONOCRYL PLUS 4-0 18" PS-2 UNDYED

## (undated) DEVICE — TUBING INFILTRATION PVC

## (undated) DEVICE — MARKING PEN W RULER

## (undated) DEVICE — BLADE SURGICAL #15 CARBON

## (undated) DEVICE — SYR LUER LOK 20CC

## (undated) DEVICE — ELCTR STRYKER NEPTUNE BLADE COATED, INSULATED 70MM

## (undated) DEVICE — DRAIN JACKSON PRATT 7MM FLAT FULL NO TROCAR

## (undated) DEVICE — DRSG COBAN 6"

## (undated) DEVICE — MEE-ESU VALLEYLAB T2E55442DX: Type: DURABLE MEDICAL EQUIPMENT

## (undated) DEVICE — NDL HYPO SAFE 18G X 1.5" (PINK)

## (undated) DEVICE — SUT VICRYL 2-0 27" CT-1 UNDYED

## (undated) DEVICE — DRSG GAUZE MOISTURIZER 0.5 OZ 4X8

## (undated) DEVICE — DRSG BIOPATCH DISK W CHG 1" W 4.0MM HOLE

## (undated) DEVICE — ABDOMINAL BINDER XL 9" X 62"-74"

## (undated) DEVICE — WARMING BLANKET FULL UNDERBODY

## (undated) DEVICE — GLV 7.5 PROTEXIS (WHITE)

## (undated) DEVICE — NDL HYPO REGULAR BEVEL 25G X 1.5" (BLUE)

## (undated) DEVICE — DRSG DERMABOND 0.7ML

## (undated) DEVICE — GLV 6.5 PROTEXIS (WHITE)

## (undated) DEVICE — NDL COUNTER FOAM AND MAGNET 20-40

## (undated) DEVICE — SUT PLAIN GUT FAST ABSORBING 5-0 PC-1

## (undated) DEVICE — DRSG ACE BANDAGE 6"

## (undated) DEVICE — SUT VICRYL 3-0 18" PS-2 UNDYED

## (undated) DEVICE — SUT MONOCRYL 3-0 18" PS-1

## (undated) DEVICE — BAG DECANTER IV STERILE

## (undated) DEVICE — DEVICE TRANSFER DEVICE STER

## (undated) DEVICE — BLADE SURGICAL #22 CARBON

## (undated) DEVICE — FOLEY TRAY 16FR LF URINE METER SURESTEP

## (undated) DEVICE — DRSG COMBINE 5 X 9"